# Patient Record
Sex: MALE | Race: WHITE | NOT HISPANIC OR LATINO | URBAN - METROPOLITAN AREA
[De-identification: names, ages, dates, MRNs, and addresses within clinical notes are randomized per-mention and may not be internally consistent; named-entity substitution may affect disease eponyms.]

---

## 2016-07-29 RX ORDER — ZOLPIDEM TARTRATE 10 MG/1
1 TABLET ORAL
Qty: 30 | Refills: 0 | DISCHARGE
Start: 2016-07-29

## 2016-07-29 RX ORDER — ZOLPIDEM TARTRATE 10 MG/1
0 TABLET ORAL
Qty: 30 | Refills: 0 | COMMUNITY
Start: 2016-07-29

## 2016-11-24 RX ORDER — SIMVASTATIN 20 MG/1
0 TABLET, FILM COATED ORAL
Qty: 45 | Refills: 0 | COMMUNITY
Start: 2016-11-24

## 2016-11-24 RX ORDER — SIMVASTATIN 20 MG/1
1 TABLET, FILM COATED ORAL
Qty: 45 | Refills: 0 | COMMUNITY
Start: 2016-11-24

## 2017-02-02 RX ORDER — CARBIDOPA AND LEVODOPA 25; 100 MG/1; MG/1
1 TABLET ORAL
Qty: 540 | Refills: 0 | DISCHARGE
Start: 2017-02-02

## 2017-02-02 RX ORDER — CARBIDOPA AND LEVODOPA 25; 100 MG/1; MG/1
0 TABLET ORAL
Qty: 540 | Refills: 0 | COMMUNITY
Start: 2017-02-02

## 2017-02-08 ENCOUNTER — EMERGENCY (EMERGENCY)
Facility: HOSPITAL | Age: 77
LOS: 1 days | Discharge: HOME | End: 2017-02-08
Attending: EMERGENCY MEDICINE | Admitting: EMERGENCY MEDICINE
Payer: MEDICARE

## 2017-02-08 VITALS
OXYGEN SATURATION: 95 % | WEIGHT: 184.97 LBS | RESPIRATION RATE: 16 BRPM | SYSTOLIC BLOOD PRESSURE: 120 MMHG | DIASTOLIC BLOOD PRESSURE: 78 MMHG | TEMPERATURE: 98 F | HEART RATE: 84 BPM

## 2017-02-08 VITALS
TEMPERATURE: 98 F | RESPIRATION RATE: 20 BRPM | OXYGEN SATURATION: 96 % | SYSTOLIC BLOOD PRESSURE: 141 MMHG | HEART RATE: 80 BPM | DIASTOLIC BLOOD PRESSURE: 86 MMHG

## 2017-02-08 DIAGNOSIS — Z95.5 PRESENCE OF CORONARY ANGIOPLASTY IMPLANT AND GRAFT: ICD-10-CM

## 2017-02-08 DIAGNOSIS — I25.10 ATHEROSCLEROTIC HEART DISEASE OF NATIVE CORONARY ARTERY WITHOUT ANGINA PECTORIS: ICD-10-CM

## 2017-02-08 DIAGNOSIS — E78.5 HYPERLIPIDEMIA, UNSPECIFIED: ICD-10-CM

## 2017-02-08 DIAGNOSIS — Z87.891 PERSONAL HISTORY OF NICOTINE DEPENDENCE: ICD-10-CM

## 2017-02-08 DIAGNOSIS — R10.31 RIGHT LOWER QUADRANT PAIN: ICD-10-CM

## 2017-02-08 DIAGNOSIS — G20 PARKINSON'S DISEASE: ICD-10-CM

## 2017-02-08 DIAGNOSIS — Z79.02 LONG TERM (CURRENT) USE OF ANTITHROMBOTICS/ANTIPLATELETS: ICD-10-CM

## 2017-02-08 LAB
ALBUMIN SERPL ELPH-MCNC: 3.6 G/DL — SIGNIFICANT CHANGE UP (ref 3.3–5)
ALP SERPL-CCNC: 79 U/L — SIGNIFICANT CHANGE UP (ref 40–120)
ALT FLD-CCNC: 9 U/L — LOW (ref 12–78)
AMYLASE P1 CFR SERPL: 61 U/L — SIGNIFICANT CHANGE UP (ref 25–115)
ANION GAP SERPL CALC-SCNC: 8 MMOL/L — SIGNIFICANT CHANGE UP (ref 5–17)
APPEARANCE UR: CLEAR — SIGNIFICANT CHANGE UP
APTT BLD: 33.6 SEC — SIGNIFICANT CHANGE UP (ref 27.5–37.4)
AST SERPL-CCNC: 15 U/L — SIGNIFICANT CHANGE UP (ref 15–37)
BACTERIA # UR AUTO: ABNORMAL
BASOPHILS # BLD AUTO: 0.1 K/UL — SIGNIFICANT CHANGE UP (ref 0–0.2)
BASOPHILS NFR BLD AUTO: 1.3 % — SIGNIFICANT CHANGE UP (ref 0–2)
BILIRUB SERPL-MCNC: 0.5 MG/DL — SIGNIFICANT CHANGE UP (ref 0.2–1.2)
BILIRUB UR-MCNC: NEGATIVE — SIGNIFICANT CHANGE UP
BUN SERPL-MCNC: 19 MG/DL — SIGNIFICANT CHANGE UP (ref 7–23)
CALCIUM SERPL-MCNC: 8.8 MG/DL — SIGNIFICANT CHANGE UP (ref 8.5–10.1)
CHLORIDE SERPL-SCNC: 109 MMOL/L — HIGH (ref 96–108)
CO2 SERPL-SCNC: 29 MMOL/L — SIGNIFICANT CHANGE UP (ref 22–31)
COLOR SPEC: YELLOW — SIGNIFICANT CHANGE UP
CREAT SERPL-MCNC: 0.82 MG/DL — SIGNIFICANT CHANGE UP (ref 0.5–1.3)
DIFF PNL FLD: ABNORMAL
EOSINOPHIL # BLD AUTO: 0.1 K/UL — SIGNIFICANT CHANGE UP (ref 0–0.5)
EOSINOPHIL NFR BLD AUTO: 1.4 % — SIGNIFICANT CHANGE UP (ref 0–6)
EPI CELLS # UR: SIGNIFICANT CHANGE UP
GLUCOSE SERPL-MCNC: 111 MG/DL — HIGH (ref 70–99)
GLUCOSE UR QL: NEGATIVE — SIGNIFICANT CHANGE UP
HCT VFR BLD CALC: 41 % — SIGNIFICANT CHANGE UP (ref 39–50)
HGB BLD-MCNC: 13.4 G/DL — SIGNIFICANT CHANGE UP (ref 13–17)
INR BLD: 1.14 RATIO — SIGNIFICANT CHANGE UP (ref 0.88–1.16)
KETONES UR-MCNC: NEGATIVE — SIGNIFICANT CHANGE UP
LEUKOCYTE ESTERASE UR-ACNC: NEGATIVE — SIGNIFICANT CHANGE UP
LIDOCAIN IGE QN: 111 U/L — SIGNIFICANT CHANGE UP (ref 73–393)
LYMPHOCYTES # BLD AUTO: 1.3 K/UL — SIGNIFICANT CHANGE UP (ref 1–3.3)
LYMPHOCYTES # BLD AUTO: 21.4 % — SIGNIFICANT CHANGE UP (ref 13–44)
MCHC RBC-ENTMCNC: 30.5 PG — SIGNIFICANT CHANGE UP (ref 27–34)
MCHC RBC-ENTMCNC: 32.8 GM/DL — SIGNIFICANT CHANGE UP (ref 32–36)
MCV RBC AUTO: 93 FL — SIGNIFICANT CHANGE UP (ref 80–100)
MONOCYTES # BLD AUTO: 0.8 K/UL — SIGNIFICANT CHANGE UP (ref 0–0.9)
MONOCYTES NFR BLD AUTO: 12.3 % — HIGH (ref 1–9)
NEUTROPHILS # BLD AUTO: 4 K/UL — SIGNIFICANT CHANGE UP (ref 1.8–7.4)
NEUTROPHILS NFR BLD AUTO: 63.6 % — SIGNIFICANT CHANGE UP (ref 43–77)
NITRITE UR-MCNC: NEGATIVE — SIGNIFICANT CHANGE UP
PH UR: 7 — SIGNIFICANT CHANGE UP (ref 4.8–8)
PLATELET # BLD AUTO: 200 K/UL — SIGNIFICANT CHANGE UP (ref 150–400)
POTASSIUM SERPL-MCNC: 4 MMOL/L — SIGNIFICANT CHANGE UP (ref 3.5–5.3)
POTASSIUM SERPL-SCNC: 4 MMOL/L — SIGNIFICANT CHANGE UP (ref 3.5–5.3)
PROT SERPL-MCNC: 6.9 G/DL — SIGNIFICANT CHANGE UP (ref 6–8.3)
PROT UR-MCNC: NEGATIVE — SIGNIFICANT CHANGE UP
PROTHROM AB SERPL-ACNC: 12.7 SEC — SIGNIFICANT CHANGE UP (ref 10–13.1)
RBC # BLD: 4.41 M/UL — SIGNIFICANT CHANGE UP (ref 4.2–5.8)
RBC # FLD: 12.7 % — SIGNIFICANT CHANGE UP (ref 10.3–14.5)
RBC CASTS # UR COMP ASSIST: SIGNIFICANT CHANGE UP /HPF (ref 0–4)
SODIUM SERPL-SCNC: 146 MMOL/L — HIGH (ref 135–145)
SP GR SPEC: 1.02 — SIGNIFICANT CHANGE UP (ref 1.01–1.02)
UROBILINOGEN FLD QL: NEGATIVE — SIGNIFICANT CHANGE UP
WBC # BLD: 6.2 K/UL — SIGNIFICANT CHANGE UP (ref 3.8–10.5)
WBC # FLD AUTO: 6.2 K/UL — SIGNIFICANT CHANGE UP (ref 3.8–10.5)
WBC UR QL: SIGNIFICANT CHANGE UP

## 2017-02-08 PROCEDURE — 80053 COMPREHEN METABOLIC PANEL: CPT

## 2017-02-08 PROCEDURE — 83690 ASSAY OF LIPASE: CPT

## 2017-02-08 PROCEDURE — 82150 ASSAY OF AMYLASE: CPT

## 2017-02-08 PROCEDURE — 81001 URINALYSIS AUTO W/SCOPE: CPT

## 2017-02-08 PROCEDURE — 74020: CPT

## 2017-02-08 PROCEDURE — 85610 PROTHROMBIN TIME: CPT

## 2017-02-08 PROCEDURE — 74177 CT ABD & PELVIS W/CONTRAST: CPT

## 2017-02-08 PROCEDURE — 71020: CPT | Mod: 26

## 2017-02-08 PROCEDURE — 74020: CPT | Mod: 26

## 2017-02-08 PROCEDURE — 99284 EMERGENCY DEPT VISIT MOD MDM: CPT | Mod: 25

## 2017-02-08 PROCEDURE — 85730 THROMBOPLASTIN TIME PARTIAL: CPT

## 2017-02-08 PROCEDURE — 74177 CT ABD & PELVIS W/CONTRAST: CPT | Mod: 26

## 2017-02-08 PROCEDURE — 99285 EMERGENCY DEPT VISIT HI MDM: CPT

## 2017-02-08 PROCEDURE — 87086 URINE CULTURE/COLONY COUNT: CPT

## 2017-02-08 PROCEDURE — 85027 COMPLETE CBC AUTOMATED: CPT

## 2017-02-08 PROCEDURE — 71046 X-RAY EXAM CHEST 2 VIEWS: CPT

## 2017-02-08 RX ORDER — IOHEXOL 300 MG/ML
30 INJECTION, SOLUTION INTRAVENOUS ONCE
Qty: 0 | Refills: 0 | Status: COMPLETED | OUTPATIENT
Start: 2017-02-08 | End: 2017-02-08

## 2017-02-08 RX ORDER — SODIUM CHLORIDE 9 MG/ML
3 INJECTION INTRAMUSCULAR; INTRAVENOUS; SUBCUTANEOUS ONCE
Qty: 0 | Refills: 0 | Status: COMPLETED | OUTPATIENT
Start: 2017-02-08 | End: 2017-02-08

## 2017-02-08 RX ORDER — SODIUM CHLORIDE 9 MG/ML
1000 INJECTION INTRAMUSCULAR; INTRAVENOUS; SUBCUTANEOUS
Qty: 0 | Refills: 0 | Status: COMPLETED | OUTPATIENT
Start: 2017-02-08 | End: 2017-02-08

## 2017-02-08 RX ADMIN — SODIUM CHLORIDE 1000 MILLILITER(S): 9 INJECTION INTRAMUSCULAR; INTRAVENOUS; SUBCUTANEOUS at 16:45

## 2017-02-08 RX ADMIN — SODIUM CHLORIDE 3 MILLILITER(S): 9 INJECTION INTRAMUSCULAR; INTRAVENOUS; SUBCUTANEOUS at 18:59

## 2017-02-08 RX ADMIN — IOHEXOL 30 MILLILITER(S): 300 INJECTION, SOLUTION INTRAVENOUS at 16:45

## 2017-02-08 RX ADMIN — SODIUM CHLORIDE 1000 MILLILITER(S): 9 INJECTION INTRAMUSCULAR; INTRAVENOUS; SUBCUTANEOUS at 18:59

## 2017-02-08 RX ADMIN — SODIUM CHLORIDE 1000 MILLILITER(S): 9 INJECTION INTRAMUSCULAR; INTRAVENOUS; SUBCUTANEOUS at 18:30

## 2017-02-08 NOTE — ED PROVIDER NOTE - PROGRESS NOTE DETAILS
Reevaluated patient at bedside.  Patient feeling much improved.  Abdomen is now soft, non-tender. Discussed the results of all diagnostic testing in ED.  An opportunity to ask questions was given.  Discussed the nature of diagnostic testing in the abdomen and the importance of continued reevaluation.  Understanding of the potential risk of occult pathology was verbalized and the patient will continue to follow-up as an outpatient for further workup and evaluation until resolution.  Discussed the importance of prompt, close medical follow-up.  Patient will return with any changes, concerns or persistent / worsening symptoms.   All results were explained to patient and family, including all CT findings,  and a copy of all available results given.

## 2017-02-08 NOTE — ED PROVIDER NOTE - OBJECTIVE STATEMENT
76 yo M p/w RLQ pain today, onset this afternoon. Pt states felt same pain 1 week ago, then resolved until today. No fever/chills. No n/v/d. NO weak / dizzy / malaise. no recent illness. No recent trauma. No recent sick contacts. NO agg/allev factors. No other inj or co.

## 2017-02-08 NOTE — ED PROVIDER NOTE - CHPI ED SYMPTOMS NEG
no blood in stool/no burning urination/no hematuria/no dysuria/no diarrhea/no abdominal distension/no chills/no fever

## 2017-02-08 NOTE — ED ADULT NURSE NOTE - PMH
CAD (Coronary Artery Disease)  2001, s/p cardiac cath with stent 2001  Hyperlipidemia    Hyperlipidemia  2006  Parkinson Disease  2009  Renal Calculi  1984

## 2017-02-08 NOTE — ED ADULT NURSE NOTE - CHPI ED SYMPTOMS NEG
no abdominal distension/no blood in stool/no dysuria/no hematuria/no diarrhea/no nausea/no chills/no vomiting/no burning urination/no fever

## 2017-02-08 NOTE — ED PROVIDER NOTE - GASTROINTESTINAL, MLM
Abdomen soft, mild tender R upper pelvis, ? reducible hernia, no rebound,  no guarding. No mid / upper abd tend. No HSM.

## 2017-02-08 NOTE — ED ADULT NURSE REASSESSMENT NOTE - NS ED NURSE REASSESS COMMENT FT1
discharge instructions explained and a hard copy provided. patient is been discharged in stable conditions
pt taken for CT

## 2017-02-09 LAB
CULTURE RESULTS: NO GROWTH — SIGNIFICANT CHANGE UP
SPECIMEN SOURCE: SIGNIFICANT CHANGE UP

## 2017-02-22 RX ORDER — FINASTERIDE 5 MG/1
1 TABLET, FILM COATED ORAL
Qty: 90 | Refills: 0 | COMMUNITY
Start: 2017-02-22

## 2017-02-22 RX ORDER — FINASTERIDE 5 MG/1
0 TABLET, FILM COATED ORAL
Qty: 90 | Refills: 0 | COMMUNITY
Start: 2017-02-22

## 2017-03-03 ENCOUNTER — OUTPATIENT (OUTPATIENT)
Dept: OUTPATIENT SERVICES | Facility: HOSPITAL | Age: 77
LOS: 1 days | End: 2017-03-03
Payer: MEDICARE

## 2017-03-03 VITALS
DIASTOLIC BLOOD PRESSURE: 80 MMHG | WEIGHT: 180.78 LBS | HEART RATE: 79 BPM | SYSTOLIC BLOOD PRESSURE: 110 MMHG | TEMPERATURE: 98 F | OXYGEN SATURATION: 98 % | HEIGHT: 69 IN | RESPIRATION RATE: 12 BRPM

## 2017-03-03 DIAGNOSIS — K40.90 UNILATERAL INGUINAL HERNIA, WITHOUT OBSTRUCTION OR GANGRENE, NOT SPECIFIED AS RECURRENT: ICD-10-CM

## 2017-03-03 DIAGNOSIS — Z01.818 ENCOUNTER FOR OTHER PREPROCEDURAL EXAMINATION: ICD-10-CM

## 2017-03-03 LAB
ALBUMIN SERPL ELPH-MCNC: 3.9 G/DL — SIGNIFICANT CHANGE UP (ref 3.3–5)
ALP SERPL-CCNC: 79 U/L — SIGNIFICANT CHANGE UP (ref 30–120)
ALT FLD-CCNC: <10 U/L DA — LOW (ref 10–60)
ANION GAP SERPL CALC-SCNC: 5 MMOL/L — SIGNIFICANT CHANGE UP (ref 5–17)
AST SERPL-CCNC: 15 U/L — SIGNIFICANT CHANGE UP (ref 10–40)
BILIRUB SERPL-MCNC: 0.5 MG/DL — SIGNIFICANT CHANGE UP (ref 0.2–1.2)
BUN SERPL-MCNC: 24 MG/DL — HIGH (ref 7–23)
CALCIUM SERPL-MCNC: 9.3 MG/DL — SIGNIFICANT CHANGE UP (ref 8.4–10.5)
CHLORIDE SERPL-SCNC: 108 MMOL/L — SIGNIFICANT CHANGE UP (ref 96–108)
CO2 SERPL-SCNC: 29 MMOL/L — SIGNIFICANT CHANGE UP (ref 22–31)
CREAT SERPL-MCNC: 0.86 MG/DL — SIGNIFICANT CHANGE UP (ref 0.5–1.3)
GLUCOSE SERPL-MCNC: 112 MG/DL — HIGH (ref 70–99)
HCT VFR BLD CALC: 44.3 % — SIGNIFICANT CHANGE UP (ref 39–50)
HGB BLD-MCNC: 15 G/DL — SIGNIFICANT CHANGE UP (ref 13–17)
MCHC RBC-ENTMCNC: 32.1 PG — SIGNIFICANT CHANGE UP (ref 27–34)
MCHC RBC-ENTMCNC: 33.8 GM/DL — SIGNIFICANT CHANGE UP (ref 32–36)
MCV RBC AUTO: 94.9 FL — SIGNIFICANT CHANGE UP (ref 80–100)
PLATELET # BLD AUTO: 218 K/UL — SIGNIFICANT CHANGE UP (ref 150–400)
POTASSIUM SERPL-MCNC: 4.1 MMOL/L — SIGNIFICANT CHANGE UP (ref 3.5–5.3)
POTASSIUM SERPL-SCNC: 4.1 MMOL/L — SIGNIFICANT CHANGE UP (ref 3.5–5.3)
PROT SERPL-MCNC: 7.3 G/DL — SIGNIFICANT CHANGE UP (ref 6–8.3)
RBC # BLD: 4.67 M/UL — SIGNIFICANT CHANGE UP (ref 4.2–5.8)
RBC # FLD: 12.4 % — SIGNIFICANT CHANGE UP (ref 10.3–14.5)
SODIUM SERPL-SCNC: 142 MMOL/L — SIGNIFICANT CHANGE UP (ref 135–145)
WBC # BLD: 6.3 K/UL — SIGNIFICANT CHANGE UP (ref 3.8–10.5)
WBC # FLD AUTO: 6.3 K/UL — SIGNIFICANT CHANGE UP (ref 3.8–10.5)

## 2017-03-03 PROCEDURE — 36415 COLL VENOUS BLD VENIPUNCTURE: CPT

## 2017-03-03 PROCEDURE — 85027 COMPLETE CBC AUTOMATED: CPT

## 2017-03-03 PROCEDURE — 80053 COMPREHEN METABOLIC PANEL: CPT

## 2017-03-03 PROCEDURE — G0463: CPT

## 2017-03-03 RX ORDER — TRIHEXYPHENIDYL HCL 2 MG
0 TABLET ORAL
Qty: 0 | Refills: 0 | COMMUNITY

## 2017-03-03 NOTE — H&P PST ADULT - NSANTHOSAYNRD_GEN_A_CORE
No. TOBY screening performed.  STOP BANG Legend: 0-2 = LOW Risk; 3-4 = INTERMEDIATE Risk; 5-8 = HIGH Risk

## 2017-03-03 NOTE — H&P PST ADULT - PROBLEM SELECTOR PLAN 1
Right inguinal hernia repair NPO after Midnight.  Medical & Cardiac Clearance.  Surgical scrub reviewed. D/C instructions reviewed. Handouts given. Reviewed pepcid use.  Medication reviewed.  Advised patient to stop 7 days prior to surgery Stop Follow up with PMD on Plavix/ASA hold date prior to surgery. NSAIDS, ASA herbals, vit E .  Patient advised of no jewelry day of surgery.  Patient given pre operative wash with instructions.  Pre op info sheet given. Day of Surgery you can take the following meds with a small sip of water.  Finasteride, Sinemet, Trihexyphenidyl.

## 2017-03-03 NOTE — H&P PST ADULT - PMH
CAD (Coronary Artery Disease)  2001, s/p cardiac cath with stent 2001  Constipation    Hyperlipidemia  2006  Inguinal hernia  right  Parkinson Disease  2009  Renal Calculi  1984

## 2017-03-03 NOTE — H&P PST ADULT - HISTORY OF PRESENT ILLNESS
This is a 76 yo male who presents to [facility] with hx of right inguinal pain 8/10 x 1 month seen in ED in [place] and referred to    Dr Nance  secondary to decrease ambulation, mobility and ADL function pt. referred for surgery

## 2017-03-06 RX ORDER — SODIUM CHLORIDE 9 MG/ML
1000 INJECTION, SOLUTION INTRAVENOUS
Qty: 0 | Refills: 0 | Status: DISCONTINUED | OUTPATIENT
Start: 2017-03-09 | End: 2017-03-24

## 2017-03-06 NOTE — ASU DISCHARGE PLAN (ADULT/PEDIATRIC). - SPECIAL INSTRUCTIONS
remove pain catheter in 48 hours and apply band aid to site   apply pressure to pain catheter site for a full 10 minutes by the clock when you take out the pain catheter

## 2017-03-06 NOTE — ASU DISCHARGE PLAN (ADULT/PEDIATRIC). - MEDICATION SUMMARY - MEDICATIONS TO TAKE
I will START or STAY ON the medications listed below when I get home from the hospital:    chlorhexidine gluconateal rinse UPS 0.12%  -- 1 cap(s) by mouth 3 times a week  -- Indication: For as per md    FINASTERIDE 5 MG TABS  -- 1 tab(s) by mouth once a day  -- Indication: For as pe md    acetaminophen-oxyCODONE 325 mg-5 mg oral tablet  -- 1 tab(s) by mouth every 6 hours, As Needed -for moderate pain MDD:4  -- Caution federal law prohibits the transfer of this drug to any person other  than the person for whom it was prescribed.  May cause drowsiness.  Alcohol may intensify this effect.  Use care when operating dangerous machinery.  This prescription cannot be refilled.  This product contains acetaminophen.  Do not use  with any other product containing acetaminophen to prevent possible liver damage.  Using more of this medication than prescribed may cause serious breathing problems.    -- Indication: For pain    Aspir 81 oral delayed release tablet  -- 1 tab(s) by mouth once a day  -- Indication: For as per md    SIMVASTATIN 40 MG TABS  -- 1 tab(s) by mouth once a day (at bedtime)  -- Indication: For as per md    trihexyphenidyl  -- 2 milligram(s) by mouth 3 times a day  -- Indication: For as per md    CARB/LEVO    TAB 25-100MG  -- 1 tab(s) by mouth 3 times a day  -- Indication: For as per md    Plavix 75 mg oral tablet  -- 1 tab(s) by mouth once a day  -- Indication: For as per md    ZOLPIDEM TARTRATE 10 MG TABS  -- 1 tab(s) by mouth once a day (at bedtime), As Needed  -- Indication: For as per md    magnesium citrate  -- 400 milligram(s) by mouth 2 times a day  -- Indication: For as per md    docusate sodium 100 mg oral capsule  -- 1 cap(s) by mouth 2 times a day  -- Indication: For as per md    Citracal  -- 1 tab(s) by mouth once a day  -- Indication: For as per md    Co Q-10 100 mg oral capsule  -- 2 cap(s) by mouth 2 times a day  -- Indication: For as per md    Systane Ultra Preservative Free ophthalmic solution  -- 1 drop(s) to each affected eye once a day  -- Indication: For as per md    Centrum Silver oral tablet  -- 1 tab(s) by mouth once a day  -- Indication: For as per md    Vitamin D3 1000 intl units oral capsule  -- 1 cap(s) by mouth once a day  -- Indication: For as per md

## 2017-03-08 ENCOUNTER — RESULT REVIEW (OUTPATIENT)
Age: 77
End: 2017-03-08

## 2017-03-09 ENCOUNTER — OUTPATIENT (OUTPATIENT)
Dept: OUTPATIENT SERVICES | Facility: HOSPITAL | Age: 77
LOS: 1 days | End: 2017-03-09
Payer: MEDICARE

## 2017-03-09 ENCOUNTER — TRANSCRIPTION ENCOUNTER (OUTPATIENT)
Age: 77
End: 2017-03-09

## 2017-03-09 VITALS
RESPIRATION RATE: 18 BRPM | HEART RATE: 73 BPM | HEIGHT: 70 IN | OXYGEN SATURATION: 96 % | DIASTOLIC BLOOD PRESSURE: 77 MMHG | SYSTOLIC BLOOD PRESSURE: 123 MMHG | WEIGHT: 178.79 LBS | TEMPERATURE: 97 F

## 2017-03-09 VITALS
DIASTOLIC BLOOD PRESSURE: 78 MMHG | SYSTOLIC BLOOD PRESSURE: 128 MMHG | HEART RATE: 73 BPM | RESPIRATION RATE: 18 BRPM | OXYGEN SATURATION: 100 %

## 2017-03-09 DIAGNOSIS — Z01.818 ENCOUNTER FOR OTHER PREPROCEDURAL EXAMINATION: ICD-10-CM

## 2017-03-09 DIAGNOSIS — K40.90 UNILATERAL INGUINAL HERNIA, WITHOUT OBSTRUCTION OR GANGRENE, NOT SPECIFIED AS RECURRENT: ICD-10-CM

## 2017-03-09 PROCEDURE — C1781: CPT

## 2017-03-09 PROCEDURE — 11981 INSERTION DRUG DLVR IMPLANT: CPT | Mod: AS

## 2017-03-09 PROCEDURE — 49505 PRP I/HERN INIT REDUC >5 YR: CPT | Mod: RT

## 2017-03-09 PROCEDURE — 49505 PRP I/HERN INIT REDUC >5 YR: CPT | Mod: AS

## 2017-03-09 PROCEDURE — 88305 TISSUE EXAM BY PATHOLOGIST: CPT

## 2017-03-09 PROCEDURE — 88302 TISSUE EXAM BY PATHOLOGIST: CPT

## 2017-03-09 PROCEDURE — C1889: CPT

## 2017-03-09 PROCEDURE — 88302 TISSUE EXAM BY PATHOLOGIST: CPT | Mod: 26

## 2017-03-09 PROCEDURE — 88305 TISSUE EXAM BY PATHOLOGIST: CPT | Mod: 26

## 2017-03-09 RX ORDER — CEFAZOLIN SODIUM 1 G
2000 VIAL (EA) INJECTION ONCE
Qty: 0 | Refills: 0 | Status: COMPLETED | OUTPATIENT
Start: 2017-03-09 | End: 2017-03-09

## 2017-03-09 RX ORDER — ONDANSETRON 8 MG/1
4 TABLET, FILM COATED ORAL ONCE
Qty: 0 | Refills: 0 | Status: DISCONTINUED | OUTPATIENT
Start: 2017-03-09 | End: 2017-03-24

## 2017-03-09 RX ORDER — HYDROMORPHONE HYDROCHLORIDE 2 MG/ML
0.5 INJECTION INTRAMUSCULAR; INTRAVENOUS; SUBCUTANEOUS
Qty: 0 | Refills: 0 | Status: DISCONTINUED | OUTPATIENT
Start: 2017-03-09 | End: 2017-03-09

## 2017-03-09 RX ORDER — OXYCODONE HYDROCHLORIDE 5 MG/1
1 TABLET ORAL
Qty: 28 | Refills: 0 | OUTPATIENT
Start: 2017-03-09 | End: 2017-03-16

## 2017-03-09 RX ORDER — TAMSULOSIN HYDROCHLORIDE 0.4 MG/1
0.4 CAPSULE ORAL ONCE
Qty: 0 | Refills: 0 | Status: COMPLETED | OUTPATIENT
Start: 2017-03-09 | End: 2017-03-09

## 2017-03-09 RX ORDER — BUPIVACAINE HCL/PF 7.5 MG/ML
100 VIAL (ML) INJECTION
Qty: 0 | Refills: 0 | Status: DISCONTINUED | OUTPATIENT
Start: 2017-03-09 | End: 2017-03-24

## 2017-03-09 RX ORDER — OXYCODONE HYDROCHLORIDE 5 MG/1
5 TABLET ORAL ONCE
Qty: 0 | Refills: 0 | Status: DISCONTINUED | OUTPATIENT
Start: 2017-03-09 | End: 2017-03-09

## 2017-03-09 RX ORDER — SODIUM CHLORIDE 9 MG/ML
1000 INJECTION, SOLUTION INTRAVENOUS
Qty: 0 | Refills: 0 | Status: DISCONTINUED | OUTPATIENT
Start: 2017-03-09 | End: 2017-03-24

## 2017-03-09 RX ADMIN — SODIUM CHLORIDE 100 MILLILITER(S): 9 INJECTION, SOLUTION INTRAVENOUS at 10:11

## 2017-03-09 RX ADMIN — SODIUM CHLORIDE 75 MILLILITER(S): 9 INJECTION, SOLUTION INTRAVENOUS at 07:53

## 2017-03-09 RX ADMIN — TAMSULOSIN HYDROCHLORIDE 0.4 MILLIGRAM(S): 0.4 CAPSULE ORAL at 10:55

## 2017-08-18 NOTE — ASU DISCHARGE PLAN (ADULT/PEDIATRIC). - NOTIFY
Urology service Dr. Yanez Pain not relieved by Medications/Bleeding that does not stop/Fever greater than 101/Persistent Nausea and Vomiting/Unable to Urinate Urology service Dr. Yanez 

## 2018-07-24 ENCOUNTER — EMERGENCY (EMERGENCY)
Facility: HOSPITAL | Age: 78
LOS: 1 days | Discharge: HOME | End: 2018-07-24
Attending: EMERGENCY MEDICINE
Payer: MEDICARE

## 2018-07-24 VITALS
SYSTOLIC BLOOD PRESSURE: 150 MMHG | HEIGHT: 72 IN | RESPIRATION RATE: 14 BRPM | HEART RATE: 79 BPM | WEIGHT: 175.05 LBS | DIASTOLIC BLOOD PRESSURE: 90 MMHG | TEMPERATURE: 98 F | OXYGEN SATURATION: 99 %

## 2018-07-24 VITALS
DIASTOLIC BLOOD PRESSURE: 72 MMHG | HEART RATE: 78 BPM | TEMPERATURE: 98 F | SYSTOLIC BLOOD PRESSURE: 140 MMHG | RESPIRATION RATE: 14 BRPM | OXYGEN SATURATION: 99 %

## 2018-07-24 PROBLEM — K59.00 CONSTIPATION, UNSPECIFIED: Chronic | Status: ACTIVE | Noted: 2017-03-03

## 2018-07-24 PROBLEM — K40.90 UNILATERAL INGUINAL HERNIA, WITHOUT OBSTRUCTION OR GANGRENE, NOT SPECIFIED AS RECURRENT: Chronic | Status: ACTIVE | Noted: 2017-03-03

## 2018-07-24 LAB
ALBUMIN SERPL ELPH-MCNC: 3.7 G/DL — SIGNIFICANT CHANGE UP (ref 3.3–5)
ALP SERPL-CCNC: 84 U/L — SIGNIFICANT CHANGE UP (ref 40–120)
ALT FLD-CCNC: 25 U/L — SIGNIFICANT CHANGE UP (ref 12–78)
ANION GAP SERPL CALC-SCNC: 7 MMOL/L — SIGNIFICANT CHANGE UP (ref 5–17)
APTT BLD: 29.7 SEC — SIGNIFICANT CHANGE UP (ref 27.5–37.4)
AST SERPL-CCNC: 16 U/L — SIGNIFICANT CHANGE UP (ref 15–37)
BASOPHILS # BLD AUTO: 0.03 K/UL — SIGNIFICANT CHANGE UP (ref 0–0.2)
BASOPHILS NFR BLD AUTO: 0.4 % — SIGNIFICANT CHANGE UP (ref 0–2)
BILIRUB SERPL-MCNC: 0.5 MG/DL — SIGNIFICANT CHANGE UP (ref 0.2–1.2)
BUN SERPL-MCNC: 19 MG/DL — SIGNIFICANT CHANGE UP (ref 7–23)
CALCIUM SERPL-MCNC: 9.3 MG/DL — SIGNIFICANT CHANGE UP (ref 8.5–10.1)
CHLORIDE SERPL-SCNC: 108 MMOL/L — SIGNIFICANT CHANGE UP (ref 96–108)
CK MB BLD-MCNC: <2 % — SIGNIFICANT CHANGE UP (ref 0–3.5)
CK MB CFR SERPL CALC: <1 NG/ML — SIGNIFICANT CHANGE UP (ref 0–3.6)
CK SERPL-CCNC: 50 U/L — SIGNIFICANT CHANGE UP (ref 26–308)
CO2 SERPL-SCNC: 28 MMOL/L — SIGNIFICANT CHANGE UP (ref 22–31)
CREAT SERPL-MCNC: 0.92 MG/DL — SIGNIFICANT CHANGE UP (ref 0.5–1.3)
EOSINOPHIL # BLD AUTO: 0.02 K/UL — SIGNIFICANT CHANGE UP (ref 0–0.5)
EOSINOPHIL NFR BLD AUTO: 0.3 % — SIGNIFICANT CHANGE UP (ref 0–6)
GLUCOSE SERPL-MCNC: 128 MG/DL — HIGH (ref 70–99)
HCT VFR BLD CALC: 40.4 % — SIGNIFICANT CHANGE UP (ref 39–50)
HGB BLD-MCNC: 13.5 G/DL — SIGNIFICANT CHANGE UP (ref 13–17)
IMM GRANULOCYTES NFR BLD AUTO: 0.3 % — SIGNIFICANT CHANGE UP (ref 0–1.5)
INR BLD: 1.1 RATIO — SIGNIFICANT CHANGE UP (ref 0.88–1.16)
LIDOCAIN IGE QN: 76 U/L — SIGNIFICANT CHANGE UP (ref 73–393)
LYMPHOCYTES # BLD AUTO: 1.07 K/UL — SIGNIFICANT CHANGE UP (ref 1–3.3)
LYMPHOCYTES # BLD AUTO: 15.1 % — SIGNIFICANT CHANGE UP (ref 13–44)
MAGNESIUM SERPL-MCNC: 2.2 MG/DL — SIGNIFICANT CHANGE UP (ref 1.6–2.6)
MCHC RBC-ENTMCNC: 31.2 PG — SIGNIFICANT CHANGE UP (ref 27–34)
MCHC RBC-ENTMCNC: 33.4 GM/DL — SIGNIFICANT CHANGE UP (ref 32–36)
MCV RBC AUTO: 93.3 FL — SIGNIFICANT CHANGE UP (ref 80–100)
MONOCYTES # BLD AUTO: 0.63 K/UL — SIGNIFICANT CHANGE UP (ref 0–0.9)
MONOCYTES NFR BLD AUTO: 8.9 % — SIGNIFICANT CHANGE UP (ref 2–14)
NEUTROPHILS # BLD AUTO: 5.3 K/UL — SIGNIFICANT CHANGE UP (ref 1.8–7.4)
NEUTROPHILS NFR BLD AUTO: 75 % — SIGNIFICANT CHANGE UP (ref 43–77)
PLATELET # BLD AUTO: 226 K/UL — SIGNIFICANT CHANGE UP (ref 150–400)
POTASSIUM SERPL-MCNC: 3.9 MMOL/L — SIGNIFICANT CHANGE UP (ref 3.5–5.3)
POTASSIUM SERPL-SCNC: 3.9 MMOL/L — SIGNIFICANT CHANGE UP (ref 3.5–5.3)
PROT SERPL-MCNC: 7 G/DL — SIGNIFICANT CHANGE UP (ref 6–8.3)
PROTHROM AB SERPL-ACNC: 12 SEC — SIGNIFICANT CHANGE UP (ref 9.8–12.7)
RBC # BLD: 4.33 M/UL — SIGNIFICANT CHANGE UP (ref 4.2–5.8)
RBC # FLD: 13.1 % — SIGNIFICANT CHANGE UP (ref 10.3–14.5)
SODIUM SERPL-SCNC: 143 MMOL/L — SIGNIFICANT CHANGE UP (ref 135–145)
TROPONIN I SERPL-MCNC: <.015 NG/ML — SIGNIFICANT CHANGE UP (ref 0.01–0.04)
WBC # BLD: 7.07 K/UL — SIGNIFICANT CHANGE UP (ref 3.8–10.5)
WBC # FLD AUTO: 7.07 K/UL — SIGNIFICANT CHANGE UP (ref 3.8–10.5)

## 2018-07-24 PROCEDURE — 93005 ELECTROCARDIOGRAM TRACING: CPT

## 2018-07-24 PROCEDURE — 84484 ASSAY OF TROPONIN QUANT: CPT

## 2018-07-24 PROCEDURE — 80053 COMPREHEN METABOLIC PANEL: CPT

## 2018-07-24 PROCEDURE — 85610 PROTHROMBIN TIME: CPT

## 2018-07-24 PROCEDURE — 96361 HYDRATE IV INFUSION ADD-ON: CPT

## 2018-07-24 PROCEDURE — 99284 EMERGENCY DEPT VISIT MOD MDM: CPT | Mod: 25

## 2018-07-24 PROCEDURE — 82553 CREATINE MB FRACTION: CPT

## 2018-07-24 PROCEDURE — 96375 TX/PRO/DX INJ NEW DRUG ADDON: CPT

## 2018-07-24 PROCEDURE — 96374 THER/PROPH/DIAG INJ IV PUSH: CPT

## 2018-07-24 PROCEDURE — 83735 ASSAY OF MAGNESIUM: CPT

## 2018-07-24 PROCEDURE — 82550 ASSAY OF CK (CPK): CPT

## 2018-07-24 PROCEDURE — 85027 COMPLETE CBC AUTOMATED: CPT

## 2018-07-24 PROCEDURE — 85730 THROMBOPLASTIN TIME PARTIAL: CPT

## 2018-07-24 PROCEDURE — 83690 ASSAY OF LIPASE: CPT

## 2018-07-24 PROCEDURE — 99284 EMERGENCY DEPT VISIT MOD MDM: CPT

## 2018-07-24 RX ORDER — METOCLOPRAMIDE HCL 10 MG
10 TABLET ORAL ONCE
Qty: 0 | Refills: 0 | Status: COMPLETED | OUTPATIENT
Start: 2018-07-24 | End: 2018-07-24

## 2018-07-24 RX ORDER — SODIUM CHLORIDE 9 MG/ML
1000 INJECTION INTRAMUSCULAR; INTRAVENOUS; SUBCUTANEOUS ONCE
Qty: 0 | Refills: 0 | Status: COMPLETED | OUTPATIENT
Start: 2018-07-24 | End: 2018-07-24

## 2018-07-24 RX ORDER — ONDANSETRON 8 MG/1
1 TABLET, FILM COATED ORAL
Qty: 9 | Refills: 0 | OUTPATIENT
Start: 2018-07-24 | End: 2018-07-26

## 2018-07-24 RX ORDER — ONDANSETRON 8 MG/1
4 TABLET, FILM COATED ORAL ONCE
Qty: 0 | Refills: 0 | Status: COMPLETED | OUTPATIENT
Start: 2018-07-24 | End: 2018-07-24

## 2018-07-24 RX ADMIN — SODIUM CHLORIDE 1000 MILLILITER(S): 9 INJECTION INTRAMUSCULAR; INTRAVENOUS; SUBCUTANEOUS at 02:00

## 2018-07-24 RX ADMIN — ONDANSETRON 4 MILLIGRAM(S): 8 TABLET, FILM COATED ORAL at 02:08

## 2018-07-24 RX ADMIN — Medication 10 MILLIGRAM(S): at 02:40

## 2018-07-24 NOTE — ED PROVIDER NOTE - OBJECTIVE STATEMENT
77 yo male hx of CAD with stent on plavix c/o nausea/dizziness since 1pm.  No syncope.  No chest pain, no shortness of breath. No headache.  No abdominal pain, no vomiting, no diarrhea. PMD. Dr. Hebert.  Cards from [email or web address].

## 2018-07-24 NOTE — ED ADULT NURSE NOTE - OBJECTIVE STATEMENT
patient a/o x 4 with a calm affect c/o nausea, dizziness and feels like if he vomits, he'd feel better.  patient denies abdominal pains at this time, pending results of initial lab work

## 2018-12-06 ENCOUNTER — EMERGENCY (EMERGENCY)
Facility: HOSPITAL | Age: 78
LOS: 1 days | Discharge: HOME | End: 2018-12-06
Attending: EMERGENCY MEDICINE | Admitting: EMERGENCY MEDICINE
Payer: MEDICARE

## 2018-12-06 VITALS
SYSTOLIC BLOOD PRESSURE: 147 MMHG | TEMPERATURE: 98 F | RESPIRATION RATE: 16 BRPM | HEART RATE: 65 BPM | OXYGEN SATURATION: 96 % | HEIGHT: 72 IN | WEIGHT: 169.98 LBS | DIASTOLIC BLOOD PRESSURE: 83 MMHG

## 2018-12-06 VITALS
RESPIRATION RATE: 16 BRPM | SYSTOLIC BLOOD PRESSURE: 124 MMHG | HEART RATE: 82 BPM | OXYGEN SATURATION: 97 % | TEMPERATURE: 98 F | DIASTOLIC BLOOD PRESSURE: 82 MMHG

## 2018-12-06 LAB
ALBUMIN SERPL ELPH-MCNC: 3.7 G/DL — SIGNIFICANT CHANGE UP (ref 3.3–5)
ALP SERPL-CCNC: 70 U/L — SIGNIFICANT CHANGE UP (ref 40–120)
ALT FLD-CCNC: 19 U/L — SIGNIFICANT CHANGE UP (ref 12–78)
ANION GAP SERPL CALC-SCNC: 6 MMOL/L — SIGNIFICANT CHANGE UP (ref 5–17)
AST SERPL-CCNC: 16 U/L — SIGNIFICANT CHANGE UP (ref 15–37)
BASOPHILS # BLD AUTO: 0.03 K/UL — SIGNIFICANT CHANGE UP (ref 0–0.2)
BASOPHILS NFR BLD AUTO: 0.5 % — SIGNIFICANT CHANGE UP (ref 0–2)
BILIRUB SERPL-MCNC: 0.6 MG/DL — SIGNIFICANT CHANGE UP (ref 0.2–1.2)
BUN SERPL-MCNC: 21 MG/DL — SIGNIFICANT CHANGE UP (ref 7–23)
CALCIUM SERPL-MCNC: 8.4 MG/DL — LOW (ref 8.5–10.1)
CHLORIDE SERPL-SCNC: 110 MMOL/L — HIGH (ref 96–108)
CO2 SERPL-SCNC: 27 MMOL/L — SIGNIFICANT CHANGE UP (ref 22–31)
CREAT SERPL-MCNC: 0.76 MG/DL — SIGNIFICANT CHANGE UP (ref 0.5–1.3)
EOSINOPHIL # BLD AUTO: 0.08 K/UL — SIGNIFICANT CHANGE UP (ref 0–0.5)
EOSINOPHIL NFR BLD AUTO: 1.4 % — SIGNIFICANT CHANGE UP (ref 0–6)
GLUCOSE SERPL-MCNC: 92 MG/DL — SIGNIFICANT CHANGE UP (ref 70–99)
HCT VFR BLD CALC: 41.4 % — SIGNIFICANT CHANGE UP (ref 39–50)
HGB BLD-MCNC: 13.5 G/DL — SIGNIFICANT CHANGE UP (ref 13–17)
IMM GRANULOCYTES NFR BLD AUTO: 0 % — SIGNIFICANT CHANGE UP (ref 0–1.5)
LYMPHOCYTES # BLD AUTO: 1.07 K/UL — SIGNIFICANT CHANGE UP (ref 1–3.3)
LYMPHOCYTES # BLD AUTO: 18.7 % — SIGNIFICANT CHANGE UP (ref 13–44)
MCHC RBC-ENTMCNC: 30.7 PG — SIGNIFICANT CHANGE UP (ref 27–34)
MCHC RBC-ENTMCNC: 32.6 GM/DL — SIGNIFICANT CHANGE UP (ref 32–36)
MCV RBC AUTO: 94.1 FL — SIGNIFICANT CHANGE UP (ref 80–100)
MONOCYTES # BLD AUTO: 0.56 K/UL — SIGNIFICANT CHANGE UP (ref 0–0.9)
MONOCYTES NFR BLD AUTO: 9.8 % — SIGNIFICANT CHANGE UP (ref 2–14)
NEUTROPHILS # BLD AUTO: 3.98 K/UL — SIGNIFICANT CHANGE UP (ref 1.8–7.4)
NEUTROPHILS NFR BLD AUTO: 69.6 % — SIGNIFICANT CHANGE UP (ref 43–77)
NRBC # BLD: 0 /100 WBCS — SIGNIFICANT CHANGE UP (ref 0–0)
PLATELET # BLD AUTO: 204 K/UL — SIGNIFICANT CHANGE UP (ref 150–400)
POTASSIUM SERPL-MCNC: 4.2 MMOL/L — SIGNIFICANT CHANGE UP (ref 3.5–5.3)
POTASSIUM SERPL-SCNC: 4.2 MMOL/L — SIGNIFICANT CHANGE UP (ref 3.5–5.3)
PROT SERPL-MCNC: 6.7 G/DL — SIGNIFICANT CHANGE UP (ref 6–8.3)
RBC # BLD: 4.4 M/UL — SIGNIFICANT CHANGE UP (ref 4.2–5.8)
RBC # FLD: 12.8 % — SIGNIFICANT CHANGE UP (ref 10.3–14.5)
SODIUM SERPL-SCNC: 143 MMOL/L — SIGNIFICANT CHANGE UP (ref 135–145)
WBC # BLD: 5.72 K/UL — SIGNIFICANT CHANGE UP (ref 3.8–10.5)
WBC # FLD AUTO: 5.72 K/UL — SIGNIFICANT CHANGE UP (ref 3.8–10.5)

## 2018-12-06 PROCEDURE — 80053 COMPREHEN METABOLIC PANEL: CPT

## 2018-12-06 PROCEDURE — 70450 CT HEAD/BRAIN W/O DYE: CPT | Mod: 26

## 2018-12-06 PROCEDURE — 99284 EMERGENCY DEPT VISIT MOD MDM: CPT

## 2018-12-06 PROCEDURE — 85027 COMPLETE CBC AUTOMATED: CPT

## 2018-12-06 PROCEDURE — 36415 COLL VENOUS BLD VENIPUNCTURE: CPT

## 2018-12-06 PROCEDURE — 70450 CT HEAD/BRAIN W/O DYE: CPT

## 2018-12-06 PROCEDURE — 99284 EMERGENCY DEPT VISIT MOD MDM: CPT | Mod: 25

## 2018-12-06 PROCEDURE — 93005 ELECTROCARDIOGRAM TRACING: CPT

## 2018-12-06 RX ORDER — SODIUM CHLORIDE 9 MG/ML
3 INJECTION INTRAMUSCULAR; INTRAVENOUS; SUBCUTANEOUS ONCE
Qty: 0 | Refills: 0 | Status: COMPLETED | OUTPATIENT
Start: 2018-12-06 | End: 2018-12-06

## 2018-12-06 RX ORDER — SODIUM CHLORIDE 9 MG/ML
1000 INJECTION INTRAMUSCULAR; INTRAVENOUS; SUBCUTANEOUS
Qty: 0 | Refills: 0 | Status: DISCONTINUED | OUTPATIENT
Start: 2018-12-06 | End: 2018-12-10

## 2018-12-06 RX ADMIN — SODIUM CHLORIDE 3 MILLILITER(S): 9 INJECTION INTRAMUSCULAR; INTRAVENOUS; SUBCUTANEOUS at 10:51

## 2018-12-06 NOTE — ED PROVIDER NOTE - PMH
CAD (Coronary Artery Disease)  2001, s/p cardiac cath with stent 2001  Constipation    Hyperlipidemia  2006  Inguinal hernia  right  Parkinson Disease  2009  Renal Calculi  1984 CAD (coronary artery disease)    CAD (Coronary Artery Disease)  2001, s/p cardiac cath with stent 2001  Constipation    Hyperlipidemia  2006  Inguinal hernia  right  Parkinson Disease  2009  Renal Calculi  1984

## 2018-12-06 NOTE — ED PROVIDER NOTE - CHPI ED SYMPTOMS NEG
no shortness of breath/no vomiting/no nausea/no syncope/no fever/no chills/no diaphoresis/no back pain/no chest pain/no cough

## 2018-12-06 NOTE — ED PROVIDER NOTE - INTERPRETATION
av 1 degree block/normal sinus rhythm, Normal axis, Normal AR interval and QRS complex. There are no acute ischemic ST or T-wave changes.

## 2018-12-06 NOTE — ED ADULT NURSE NOTE - CHIEF COMPLAINT QUOTE
Patient called EMS because he said "I am going to explode". Patient complains of palpitations and dizziness. IV 20g LAC placed with KVO of NS.

## 2018-12-06 NOTE — ED PROVIDER NOTE - PROGRESS NOTE DETAILS
All results were explained to patient and/or family and a copy of all available results given.  pt ambulated c a cane s difficulty

## 2018-12-06 NOTE — ED PROVIDER NOTE - OBJECTIVE STATEMENT
pt got up around 0800 c chest palpitation and dizziness, lasted about 1 hour.  No chest pain, sob or fever.  no prior episode.  pmd-Rafi Malone in [place]. cardio- [place]

## 2019-06-28 PROBLEM — I25.10 ATHEROSCLEROTIC HEART DISEASE OF NATIVE CORONARY ARTERY WITHOUT ANGINA PECTORIS: Chronic | Status: ACTIVE | Noted: 2018-12-06

## 2019-10-02 ENCOUNTER — APPOINTMENT (OUTPATIENT)
Dept: INTERNAL MEDICINE | Facility: CLINIC | Age: 79
End: 2019-10-02
Payer: MEDICARE

## 2019-10-02 ENCOUNTER — NON-APPOINTMENT (OUTPATIENT)
Age: 79
End: 2019-10-02

## 2019-10-02 VITALS
HEART RATE: 66 BPM | WEIGHT: 167 LBS | OXYGEN SATURATION: 97 % | TEMPERATURE: 97.9 F | SYSTOLIC BLOOD PRESSURE: 100 MMHG | DIASTOLIC BLOOD PRESSURE: 70 MMHG

## 2019-10-02 DIAGNOSIS — Z98.890 OTHER SPECIFIED POSTPROCEDURAL STATES: ICD-10-CM

## 2019-10-02 DIAGNOSIS — Z87.891 PERSONAL HISTORY OF NICOTINE DEPENDENCE: ICD-10-CM

## 2019-10-02 DIAGNOSIS — Z98.42 CATARACT EXTRACTION STATUS, RIGHT EYE: ICD-10-CM

## 2019-10-02 DIAGNOSIS — H90.5 UNSPECIFIED SENSORINEURAL HEARING LOSS: ICD-10-CM

## 2019-10-02 DIAGNOSIS — Z87.19 OTHER SPECIFIED POSTPROCEDURAL STATES: ICD-10-CM

## 2019-10-02 DIAGNOSIS — Z98.41 CATARACT EXTRACTION STATUS, RIGHT EYE: ICD-10-CM

## 2019-10-02 DIAGNOSIS — Z00.00 ENCOUNTER FOR GENERAL ADULT MEDICAL EXAMINATION W/OUT ABNORMAL FINDINGS: ICD-10-CM

## 2019-10-02 PROCEDURE — 93000 ELECTROCARDIOGRAM COMPLETE: CPT

## 2019-10-02 PROCEDURE — 36415 COLL VENOUS BLD VENIPUNCTURE: CPT

## 2019-10-02 PROCEDURE — G0439: CPT

## 2019-10-02 PROCEDURE — G0444 DEPRESSION SCREEN ANNUAL: CPT | Mod: 59

## 2019-10-02 PROCEDURE — 82270 OCCULT BLOOD FECES: CPT

## 2019-10-02 RX ORDER — CLOPIDOGREL 75 MG/1
75 TABLET, FILM COATED ORAL DAILY
Qty: 90 | Refills: 3 | Status: ACTIVE | COMMUNITY
Start: 2019-10-02

## 2019-10-02 RX ORDER — CARBIDOPA AND LEVODOPA 25; 100 MG/1; MG/1
25-100 TABLET ORAL 3 TIMES DAILY
Refills: 0 | Status: ACTIVE | COMMUNITY
Start: 2019-10-02

## 2019-10-02 RX ORDER — TRIHEXYPHENIDYL HYDROCHLORIDE 2 MG/1
2 TABLET ORAL DAILY
Refills: 0 | Status: ACTIVE | COMMUNITY
Start: 2019-10-02

## 2019-10-02 RX ORDER — MESALAMINE 375 MG/1
0.38 CAPSULE, EXTENDED RELEASE ORAL DAILY
Refills: 0 | Status: ACTIVE | COMMUNITY
Start: 2019-10-02

## 2019-10-02 NOTE — HISTORY OF PRESENT ILLNESS
[de-identified] : the patient is a 79-year-old male with known Parkinson's disease and is status post myocardial infarction in 201 followed by cardiologywithout present problems. His major disability has been Parkinson's disease and he walks with a cane and has mild cognitive dysfunction. He takes care of all ADLs he has no chest pain palpitations swelling fainting or dyspnea He has chronic constipation for which he takes is Colace twice a day and magnesium citrate. He has nocturia for which she will take saw palmetto but no other  symptoms. He has not fallen. He is due for flu shot. Normally gets colonoscopy

## 2019-10-02 NOTE — HEALTH RISK ASSESSMENT
[Relationship: ___] : Relationship: [unfilled] [With Patient/Caregiver] : With Patient/Caregiver [DNR] : DNR [Comfort care only] : comfort care only [I will adhere to the patient's wishes as expressed in the advance directive except as noted below.] : I will adhere to the patient's wishes as expressed in the advance directive except as noted below [0] : 2) Feeling down, depressed, or hopeless: Not at all (0)

## 2019-10-02 NOTE — ASSESSMENT
[FreeTextEntry1] : he patient's major problem is his Parkinson's disease and has mild cognitive dysfunction, gait abnormalityand on and off phenomena especially when starting to walk. He is using medicine for constipation which is helping him and he is followed by cardiology All screening tests will be done.the EKG is within normal limits

## 2019-10-02 NOTE — PHYSICAL EXAM
[Normal Male:] : meatus normal, the bladder was normal on palpation, the scrotum was normal, there were no testicular masses and no prostate nodules. [Normal] : no CVA tenderness, no spinal tenderness [de-identified] : ilateral hearing decrease [de-identified] : S1 and S2 are slightly decreased in intensityand there is a 1/6 ejection murmur at the [FreeTextEntry1] : back is negative prostate is mildly enlarged but without nodules [de-identified] : no adenopathy [de-identified] : mildly kyphotic//Parkinsonian gait was stable slightly bent forward using a cane [de-identified] : significant [de-identified] : mild Parkinsonian tremor right greater than left/braddykinesis/Parkinsonian gait/mask facies

## 2019-10-03 LAB
ALBUMIN SERPL ELPH-MCNC: 4.3 G/DL
ALP BLD-CCNC: 77 U/L
ALT SERPL-CCNC: <5 U/L
ANION GAP SERPL CALC-SCNC: 12 MMOL/L
APPEARANCE: CLEAR
AST SERPL-CCNC: 15 U/L
BACTERIA: NEGATIVE
BASOPHILS # BLD AUTO: 0.04 K/UL
BASOPHILS NFR BLD AUTO: 0.6 %
BILIRUB SERPL-MCNC: 0.4 MG/DL
BILIRUBIN URINE: NEGATIVE
BLOOD URINE: NORMAL
BUN SERPL-MCNC: 27 MG/DL
CALCIUM SERPL-MCNC: 9.3 MG/DL
CHLORIDE SERPL-SCNC: 107 MMOL/L
CHOLEST SERPL-MCNC: 157 MG/DL
CHOLEST/HDLC SERPL: 2.3 RATIO
CO2 SERPL-SCNC: 26 MMOL/L
COLOR: YELLOW
CREAT SERPL-MCNC: 0.85 MG/DL
EOSINOPHIL # BLD AUTO: 0.13 K/UL
EOSINOPHIL NFR BLD AUTO: 2 %
GLUCOSE QUALITATIVE U: NEGATIVE
GLUCOSE SERPL-MCNC: 94 MG/DL
HCT VFR BLD CALC: 43.3 %
HDLC SERPL-MCNC: 68 MG/DL
HGB BLD-MCNC: 13.7 G/DL
HYALINE CASTS: 1 /LPF
IMM GRANULOCYTES NFR BLD AUTO: 0.2 %
KETONES URINE: NEGATIVE
LDLC SERPL CALC-MCNC: 79 MG/DL
LEUKOCYTE ESTERASE URINE: NEGATIVE
LYMPHOCYTES # BLD AUTO: 1.5 K/UL
LYMPHOCYTES NFR BLD AUTO: 23.4 %
MAN DIFF?: NORMAL
MCHC RBC-ENTMCNC: 30.5 PG
MCHC RBC-ENTMCNC: 31.6 GM/DL
MCV RBC AUTO: 96.4 FL
MICROSCOPIC-UA: NORMAL
MONOCYTES # BLD AUTO: 0.67 K/UL
MONOCYTES NFR BLD AUTO: 10.4 %
NEUTROPHILS # BLD AUTO: 4.07 K/UL
NEUTROPHILS NFR BLD AUTO: 63.4 %
NITRITE URINE: NEGATIVE
PH URINE: 6
PLATELET # BLD AUTO: 234 K/UL
POTASSIUM SERPL-SCNC: 4.5 MMOL/L
PROT SERPL-MCNC: 6.6 G/DL
PROTEIN URINE: NEGATIVE
RBC # BLD: 4.49 M/UL
RBC # FLD: 13.6 %
RED BLOOD CELLS URINE: 1 /HPF
SODIUM SERPL-SCNC: 145 MMOL/L
SPECIFIC GRAVITY URINE: 1.02
SQUAMOUS EPITHELIAL CELLS: 1 /HPF
T4 SERPL-MCNC: 6.7 UG/DL
TRIGL SERPL-MCNC: 48 MG/DL
TSH SERPL-ACNC: 1.19 UIU/ML
UROBILINOGEN URINE: NORMAL
WBC # FLD AUTO: 6.42 K/UL
WHITE BLOOD CELLS URINE: 3 /HPF

## 2019-11-04 ENCOUNTER — RX RENEWAL (OUTPATIENT)
Age: 79
End: 2019-11-04

## 2019-11-11 ENCOUNTER — EMERGENCY (EMERGENCY)
Facility: HOSPITAL | Age: 79
LOS: 1 days | Discharge: HOME | End: 2019-11-11
Attending: EMERGENCY MEDICINE | Admitting: EMERGENCY MEDICINE
Payer: MEDICARE

## 2019-11-11 VITALS
SYSTOLIC BLOOD PRESSURE: 109 MMHG | HEART RATE: 84 BPM | DIASTOLIC BLOOD PRESSURE: 70 MMHG | RESPIRATION RATE: 16 BRPM | TEMPERATURE: 99 F | OXYGEN SATURATION: 95 %

## 2019-11-11 VITALS
OXYGEN SATURATION: 99 % | SYSTOLIC BLOOD PRESSURE: 149 MMHG | HEART RATE: 83 BPM | TEMPERATURE: 98 F | HEIGHT: 71 IN | DIASTOLIC BLOOD PRESSURE: 69 MMHG | WEIGHT: 166.89 LBS | RESPIRATION RATE: 17 BRPM

## 2019-11-11 LAB
ALBUMIN SERPL ELPH-MCNC: 3.7 G/DL — SIGNIFICANT CHANGE UP (ref 3.3–5)
ALP SERPL-CCNC: 73 U/L — SIGNIFICANT CHANGE UP (ref 40–120)
ALT FLD-CCNC: 17 U/L — SIGNIFICANT CHANGE UP (ref 12–78)
ANION GAP SERPL CALC-SCNC: 6 MMOL/L — SIGNIFICANT CHANGE UP (ref 5–17)
APPEARANCE UR: CLEAR — SIGNIFICANT CHANGE UP
AST SERPL-CCNC: 17 U/L — SIGNIFICANT CHANGE UP (ref 15–37)
BACTERIA # UR AUTO: NEGATIVE — SIGNIFICANT CHANGE UP
BASOPHILS # BLD AUTO: 0.04 K/UL — SIGNIFICANT CHANGE UP (ref 0–0.2)
BASOPHILS NFR BLD AUTO: 0.6 % — SIGNIFICANT CHANGE UP (ref 0–2)
BILIRUB SERPL-MCNC: 0.4 MG/DL — SIGNIFICANT CHANGE UP (ref 0.2–1.2)
BILIRUB UR-MCNC: NEGATIVE — SIGNIFICANT CHANGE UP
BUN SERPL-MCNC: 25 MG/DL — HIGH (ref 7–23)
CALCIUM SERPL-MCNC: 8.9 MG/DL — SIGNIFICANT CHANGE UP (ref 8.5–10.1)
CHLORIDE SERPL-SCNC: 113 MMOL/L — HIGH (ref 96–108)
CO2 SERPL-SCNC: 27 MMOL/L — SIGNIFICANT CHANGE UP (ref 22–31)
COLOR SPEC: YELLOW — SIGNIFICANT CHANGE UP
COMMENT - URINE: SIGNIFICANT CHANGE UP
CREAT SERPL-MCNC: 0.97 MG/DL — SIGNIFICANT CHANGE UP (ref 0.5–1.3)
DIFF PNL FLD: ABNORMAL
EOSINOPHIL # BLD AUTO: 0.13 K/UL — SIGNIFICANT CHANGE UP (ref 0–0.5)
EOSINOPHIL NFR BLD AUTO: 2 % — SIGNIFICANT CHANGE UP (ref 0–6)
EPI CELLS # UR: SIGNIFICANT CHANGE UP
GLUCOSE SERPL-MCNC: 80 MG/DL — SIGNIFICANT CHANGE UP (ref 70–99)
GLUCOSE UR QL: NEGATIVE — SIGNIFICANT CHANGE UP
HCT VFR BLD CALC: 40.8 % — SIGNIFICANT CHANGE UP (ref 39–50)
HGB BLD-MCNC: 13.3 G/DL — SIGNIFICANT CHANGE UP (ref 13–17)
IMM GRANULOCYTES NFR BLD AUTO: 0.2 % — SIGNIFICANT CHANGE UP (ref 0–1.5)
KETONES UR-MCNC: ABNORMAL
LEUKOCYTE ESTERASE UR-ACNC: NEGATIVE — SIGNIFICANT CHANGE UP
LYMPHOCYTES # BLD AUTO: 1.78 K/UL — SIGNIFICANT CHANGE UP (ref 1–3.3)
LYMPHOCYTES # BLD AUTO: 27.9 % — SIGNIFICANT CHANGE UP (ref 13–44)
MCHC RBC-ENTMCNC: 30.4 PG — SIGNIFICANT CHANGE UP (ref 27–34)
MCHC RBC-ENTMCNC: 32.6 GM/DL — SIGNIFICANT CHANGE UP (ref 32–36)
MCV RBC AUTO: 93.2 FL — SIGNIFICANT CHANGE UP (ref 80–100)
MONOCYTES # BLD AUTO: 0.76 K/UL — SIGNIFICANT CHANGE UP (ref 0–0.9)
MONOCYTES NFR BLD AUTO: 11.9 % — SIGNIFICANT CHANGE UP (ref 2–14)
NEUTROPHILS # BLD AUTO: 3.65 K/UL — SIGNIFICANT CHANGE UP (ref 1.8–7.4)
NEUTROPHILS NFR BLD AUTO: 57.4 % — SIGNIFICANT CHANGE UP (ref 43–77)
NITRITE UR-MCNC: NEGATIVE — SIGNIFICANT CHANGE UP
NRBC # BLD: 0 /100 WBCS — SIGNIFICANT CHANGE UP (ref 0–0)
PH UR: 5 — SIGNIFICANT CHANGE UP (ref 5–8)
PLATELET # BLD AUTO: 208 K/UL — SIGNIFICANT CHANGE UP (ref 150–400)
POTASSIUM SERPL-MCNC: 3.9 MMOL/L — SIGNIFICANT CHANGE UP (ref 3.5–5.3)
POTASSIUM SERPL-SCNC: 3.9 MMOL/L — SIGNIFICANT CHANGE UP (ref 3.5–5.3)
PROT SERPL-MCNC: 7 G/DL — SIGNIFICANT CHANGE UP (ref 6–8.3)
PROT UR-MCNC: NEGATIVE — SIGNIFICANT CHANGE UP
RBC # BLD: 4.38 M/UL — SIGNIFICANT CHANGE UP (ref 4.2–5.8)
RBC # FLD: 13.2 % — SIGNIFICANT CHANGE UP (ref 10.3–14.5)
RBC CASTS # UR COMP ASSIST: SIGNIFICANT CHANGE UP /HPF (ref 0–4)
SODIUM SERPL-SCNC: 146 MMOL/L — HIGH (ref 135–145)
SP GR SPEC: 1.01 — SIGNIFICANT CHANGE UP (ref 1.01–1.02)
TROPONIN I SERPL-MCNC: <.015 NG/ML — SIGNIFICANT CHANGE UP (ref 0.01–0.04)
UROBILINOGEN FLD QL: NEGATIVE — SIGNIFICANT CHANGE UP
WBC # BLD: 6.37 K/UL — SIGNIFICANT CHANGE UP (ref 3.8–10.5)
WBC # FLD AUTO: 6.37 K/UL — SIGNIFICANT CHANGE UP (ref 3.8–10.5)
WBC UR QL: SIGNIFICANT CHANGE UP

## 2019-11-11 PROCEDURE — 81001 URINALYSIS AUTO W/SCOPE: CPT

## 2019-11-11 PROCEDURE — 99284 EMERGENCY DEPT VISIT MOD MDM: CPT | Mod: 25

## 2019-11-11 PROCEDURE — 71045 X-RAY EXAM CHEST 1 VIEW: CPT

## 2019-11-11 PROCEDURE — 71045 X-RAY EXAM CHEST 1 VIEW: CPT | Mod: 26

## 2019-11-11 PROCEDURE — 85027 COMPLETE CBC AUTOMATED: CPT

## 2019-11-11 PROCEDURE — 70450 CT HEAD/BRAIN W/O DYE: CPT | Mod: 26

## 2019-11-11 PROCEDURE — 93010 ELECTROCARDIOGRAM REPORT: CPT

## 2019-11-11 PROCEDURE — 96374 THER/PROPH/DIAG INJ IV PUSH: CPT

## 2019-11-11 PROCEDURE — 80053 COMPREHEN METABOLIC PANEL: CPT

## 2019-11-11 PROCEDURE — 70450 CT HEAD/BRAIN W/O DYE: CPT

## 2019-11-11 PROCEDURE — 99285 EMERGENCY DEPT VISIT HI MDM: CPT

## 2019-11-11 PROCEDURE — 84484 ASSAY OF TROPONIN QUANT: CPT

## 2019-11-11 PROCEDURE — 36415 COLL VENOUS BLD VENIPUNCTURE: CPT

## 2019-11-11 PROCEDURE — 93005 ELECTROCARDIOGRAM TRACING: CPT

## 2019-11-11 RX ORDER — CARBIDOPA AND LEVODOPA 25; 100 MG/1; MG/1
2 TABLET ORAL ONCE
Refills: 0 | Status: COMPLETED | OUTPATIENT
Start: 2019-11-11 | End: 2019-11-11

## 2019-11-11 RX ORDER — ONDANSETRON 8 MG/1
4 TABLET, FILM COATED ORAL ONCE
Refills: 0 | Status: COMPLETED | OUTPATIENT
Start: 2019-11-11 | End: 2019-11-11

## 2019-11-11 RX ORDER — TRIHEXYPHENIDYL HCL 2 MG
2 TABLET ORAL ONCE
Refills: 0 | Status: COMPLETED | OUTPATIENT
Start: 2019-11-11 | End: 2019-11-11

## 2019-11-11 RX ADMIN — CARBIDOPA AND LEVODOPA 2 TABLET(S): 25; 100 TABLET ORAL at 18:20

## 2019-11-11 RX ADMIN — ONDANSETRON 4 MILLIGRAM(S): 8 TABLET, FILM COATED ORAL at 20:14

## 2019-11-11 RX ADMIN — Medication 2 MILLIGRAM(S): at 18:19

## 2019-11-11 NOTE — ED PROVIDER NOTE - MUSCULOSKELETAL, MLM
pt has well healed low back midline scar, walks with kyphosis antalgic gait assisted with cane and is still unsteady (per baseline per pt-stating slow gait is worse than a fast one)

## 2019-11-11 NOTE — ED PROVIDER NOTE - CHPI ED SYMPTOMS NEG
no blurred vision/no numbness/no vomiting/no loss of consciousness/no nausea/no weakness/no change in level of consciousness/no confusion/no fever

## 2019-11-11 NOTE — ED ADULT NURSE NOTE - NSIMPLEMENTINTERV_GEN_ALL_ED
Implemented All Fall Risk Interventions:  [place] to call system. Call bell, personal items and telephone within reach. Instruct patient to call for assistance. Room bathroom lighting operational. Non-slip footwear when patient is off stretcher. Physically safe environment: no spills, clutter or unnecessary equipment. Stretcher in lowest position, wheels locked, appropriate side rails in place. Provide visual cue, wrist band, yellow gown, etc. Monitor gait and stability. Monitor for mental status changes and reorient to person, place, and time. Review medications for side effects contributing to fall risk. Reinforce activity limits and safety measures with patient and family.

## 2019-11-11 NOTE — ED ADULT NURSE NOTE - PMH
CAD (coronary artery disease)    CAD (Coronary Artery Disease)  2001, s/p cardiac cath with stent 2001  Constipation    Hyperlipidemia  2006  Inguinal hernia  right  Parkinson Disease  2009  Renal Calculi  1984

## 2019-11-11 NOTE — ED PROVIDER NOTE - CLINICAL SUMMARY MEDICAL DECISION MAKING FREE TEXT BOX
light headed after skipping his medicines feeling anxious now wants evaluation no other sx ro metabolic ro neurologic provide meds as noncompliance may cause neuro sx ekg

## 2019-11-11 NOTE — ED PROVIDER NOTE - OBJECTIVE STATEMENT
pt is a 80 yo male who has parkinsons disease, cad sp mi renal colic neuro dr barry diego pmd dr cuong montes remote former smoker.  he accidently missed his lunch time meds because he had gone out to eat and did not take them with him.  he now reports feeling light headed no dizzy no cp no sob no diaphoresis but is worried.  Then he got up to go for a walk (typically unsteady due to pd uses cane) and reports he felt sob so he decided to come to er for evaluation.  never had similar sx previously

## 2019-11-11 NOTE — ED PROVIDER NOTE - PATIENT PORTAL LINK FT
You can access the FollowMyHealth Patient Portal offered by [facility] by registering at the following website: [email or web address]. By joining [facility]’s FollowMyHealth portal, you will also be able to view your health information using other applications (apps) compatible with our system.

## 2020-01-02 NOTE — ASU PATIENT PROFILE, ADULT - REASON FOR ADMISSION, PROFILE
Department of Anesthesiology  Preprocedure Note       Name:  Katie Bone   Age:  82 y.o.  :  1932                                          MRN:  [id number]         Date:  1/3/2020      Surgeon: Elmira Shafer):  Tammy Liu DDS    Procedure: DENTAL RESTORATIONS (N/A Mouth)    Medications prior to admission:   Prior to Admission medications    Medication Sig Start Date End Date Taking? Authorizing Provider   loratadine (CLARITIN) 10 MG capsule Take 10 mg by mouth daily prn   Yes Historical Provider, MD   predniSONE (DELTASONE) 10 MG tablet Take 10 mg by mouth daily Instructed to take morning of surgery with a sip of water   Yes Historical Provider, MD   levothyroxine (SYNTHROID) 100 MCG tablet Take 1 tablet by mouth daily 19  Yes Anirudh Cruz MD   hydroxychloroquine (PLAQUENIL) 200 MG tablet TAKE 1 TABLET BY MOUTH TWICE A DAY 19  Yes Desean Nelson MD   sulfaSALAzine (AZULFIDINE) 500 MG tablet TAKE 1 TABLET BY MOUTH TWICE A DAY 19  Yes Desean Nelson MD   pravastatin (PRAVACHOL) 40 MG tablet TAKE 1 TABLET BY MOUTH EVERY DAY 19  Yes Desean Nelson MD   meclizine (ANTIVERT) 12.5 MG tablet TAKE 1 TABLET BY MOUTH EVERY DAY 19  Yes Desean Nelson MD   amLODIPine (NORVASC) 5 MG tablet Take 1 tablet by mouth daily 19  Yes Desean Nelson MD   pantoprazole (PROTONIX) 40 MG tablet Take 1 tablet by mouth daily (with breakfast) 19  Yes Desean Nelson MD   DULoxetine (CYMBALTA) 60 MG extended release capsule TAKE ONE CAPSULE BY MOUTH EVERY DAY 10/29/18  Yes Desean Nelson MD   vitamin D (CHOLECALCIFEROL) 1000 UNIT TABS tablet Take 1 tablet by mouth daily.  2/15/13  Yes Anirudh Meter, MD   carbamide peroxide (DEBROX) 6.5 % otic solution Place 5 drops into both ears 3 times daily Every 4 weeks    Historical Provider, MD   Magnesium Hydroxide (MILK OF MAGNESIA PO) Take 30 mLs by mouth as needed    Historical Provider, MD   mineral oil-hydrophilic spouse. Plan discussed with CRNA. German Saldana MD   1/3/2020      DOS STAFF ADDENDUM:    Pt seen and examined, physical exam updated, chart reviewed including anesthesia, drug and allergy history. H&P reviewed. No interval changes to history or physical examination (unless noted above). NPO status confirmed. Anesthetic plan, risks, benefits, alternatives discussed with patient. Patient verbalized an understanding and agrees to proceed.      German Saldana MD  Staff Anesthesiologist  9:34 AM inguinal hernia repair

## 2020-01-04 ENCOUNTER — RECORD ABSTRACTING (OUTPATIENT)
Age: 80
End: 2020-01-04

## 2020-01-04 DIAGNOSIS — Z87.19 PERSONAL HISTORY OF OTHER DISEASES OF THE DIGESTIVE SYSTEM: ICD-10-CM

## 2020-01-04 DIAGNOSIS — K86.3 CYST OF PANCREAS: ICD-10-CM

## 2020-01-04 DIAGNOSIS — Z83.3 FAMILY HISTORY OF DIABETES MELLITUS: ICD-10-CM

## 2020-01-04 DIAGNOSIS — N40.1 BENIGN PROSTATIC HYPERPLASIA WITH LOWER URINARY TRACT SYMPMS: ICD-10-CM

## 2020-01-04 DIAGNOSIS — Z86.69 PERSONAL HISTORY OF OTHER DISEASES OF THE NERVOUS SYSTEM AND SENSE ORGANS: ICD-10-CM

## 2020-01-04 DIAGNOSIS — Z78.9 OTHER SPECIFIED HEALTH STATUS: ICD-10-CM

## 2020-01-04 DIAGNOSIS — Z87.09 PERSONAL HISTORY OF OTHER DISEASES OF THE RESPIRATORY SYSTEM: ICD-10-CM

## 2020-01-04 DIAGNOSIS — K86.2 CYST OF PANCREAS: ICD-10-CM

## 2020-01-04 DIAGNOSIS — Z86.79 PERSONAL HISTORY OF OTHER DISEASES OF THE CIRCULATORY SYSTEM: ICD-10-CM

## 2020-01-04 DIAGNOSIS — Z87.898 PERSONAL HISTORY OF OTHER SPECIFIED CONDITIONS: ICD-10-CM

## 2020-01-04 DIAGNOSIS — N20.0 CALCULUS OF KIDNEY: ICD-10-CM

## 2020-01-04 DIAGNOSIS — Z87.39 PERSONAL HISTORY OF OTHER DISEASES OF THE MUSCULOSKELETAL SYSTEM AND CONNECTIVE TISSUE: ICD-10-CM

## 2020-01-04 DIAGNOSIS — Z85.07 PERSONAL HISTORY OF MALIGNANT NEOPLASM OF PANCREAS: ICD-10-CM

## 2020-01-04 DIAGNOSIS — Z86.39 PERSONAL HISTORY OF OTHER ENDOCRINE, NUTRITIONAL AND METABOLIC DISEASE: ICD-10-CM

## 2020-01-04 DIAGNOSIS — N13.8 BENIGN PROSTATIC HYPERPLASIA WITH LOWER URINARY TRACT SYMPMS: ICD-10-CM

## 2020-01-04 DIAGNOSIS — Z86.010 PERSONAL HISTORY OF COLONIC POLYPS: ICD-10-CM

## 2020-01-04 RX ORDER — MULTIVIT-MIN/FA/LYCOPEN/LUTEIN .4-300-25
TABLET ORAL
Refills: 0 | Status: ACTIVE | COMMUNITY

## 2020-01-04 RX ORDER — UBIDECARENONE 200 MG
200 CAPSULE ORAL
Refills: 0 | Status: ACTIVE | COMMUNITY

## 2020-04-13 ENCOUNTER — RX RENEWAL (OUTPATIENT)
Age: 80
End: 2020-04-13

## 2020-04-20 ENCOUNTER — APPOINTMENT (OUTPATIENT)
Dept: INTERNAL MEDICINE | Facility: CLINIC | Age: 80
End: 2020-04-20

## 2020-07-01 ENCOUNTER — APPOINTMENT (OUTPATIENT)
Dept: INTERNAL MEDICINE | Facility: CLINIC | Age: 80
End: 2020-07-01

## 2020-07-01 ENCOUNTER — APPOINTMENT (OUTPATIENT)
Dept: INTERNAL MEDICINE | Facility: CLINIC | Age: 80
End: 2020-07-01
Payer: MEDICARE

## 2020-07-01 PROCEDURE — 99441: CPT | Mod: 95

## 2020-07-07 ENCOUNTER — RX RENEWAL (OUTPATIENT)
Age: 80
End: 2020-07-07

## 2020-07-07 RX ORDER — MIRTAZAPINE 7.5 MG/1
7.5 TABLET, FILM COATED ORAL
Qty: 90 | Refills: 3 | Status: ACTIVE | COMMUNITY
Start: 2019-10-02 | End: 1900-01-01

## 2020-08-04 NOTE — ED PROVIDER NOTE - PSH
S/P Cardiac Cath  2001 with stent, 2002  S/P Cystoscopy  X3, ESWL 1984  S/P Lumbar Discectomy  1995 39

## 2020-09-17 ENCOUNTER — APPOINTMENT (OUTPATIENT)
Dept: INTERNAL MEDICINE | Facility: CLINIC | Age: 80
End: 2020-09-17
Payer: MEDICARE

## 2020-09-17 VITALS
SYSTOLIC BLOOD PRESSURE: 100 MMHG | TEMPERATURE: 98 F | DIASTOLIC BLOOD PRESSURE: 60 MMHG | HEART RATE: 81 BPM | WEIGHT: 160 LBS | OXYGEN SATURATION: 97 %

## 2020-09-17 DIAGNOSIS — I25.10 ATHEROSCLEROTIC HEART DISEASE OF NATIVE CORONARY ARTERY W/OUT ANGINA PECTORIS: ICD-10-CM

## 2020-09-17 DIAGNOSIS — Z23 ENCOUNTER FOR IMMUNIZATION: ICD-10-CM

## 2020-09-17 DIAGNOSIS — E78.5 HYPERLIPIDEMIA, UNSPECIFIED: ICD-10-CM

## 2020-09-17 DIAGNOSIS — G20 PARKINSON'S DISEASE: ICD-10-CM

## 2020-09-17 DIAGNOSIS — I25.2 OLD MYOCARDIAL INFARCTION: ICD-10-CM

## 2020-09-17 PROCEDURE — G0008: CPT

## 2020-09-17 PROCEDURE — 36415 COLL VENOUS BLD VENIPUNCTURE: CPT

## 2020-09-17 PROCEDURE — 99214 OFFICE O/P EST MOD 30 MIN: CPT | Mod: 25

## 2020-09-17 PROCEDURE — 90662 IIV NO PRSV INCREASED AG IM: CPT

## 2020-09-17 NOTE — HISTORY OF PRESENT ILLNESS
Chief complaint:   Chief Complaint   Patient presents with   • Knee Pain     L X 1 YEAR       Vitals:  Visit Vitals  /62 (BP Location: LUE - Left upper extremity, Patient Position: Sitting, Cuff Size: Large Adult)   Pulse 74   Temp 98.3 °F (36.8 °C) (Oral)   Ht 5' 4\" (1.626 m)   Wt 99.8 kg   Breastfeeding No   BMI 37.76 kg/m²       HISTORY OF PRESENT ILLNESS     HPI  New patient 65 yo, changing from [facility] PCP  C/O left knee chronic pain since 2/2019. Previous PCP recommended PT and it was not improving,. In Octomber her PCP suspected a back problem being the cause of her knee pain. She had a MRI of L spine which was abnrmal. It showed:    1. Multilevel degenerative images most prominent at L2-L3  and L3-L4.  2. Right foraminal disc herniation at L5-S1 with some  compression of the right L5 nerve root.  3. Background of multilevel circumferential disc bulging and bilateral facet arthropathy with no critical spinal canal narrowing\"  She continued to have pain on the inside cur irradiation to the popliteal area  She ended up to see Dr Watkins with [facility] He did a n MRI of left knee which was also abnormal  \"1. Small joint effusion, with a small Baker's cyst.  2. Prominent chondromalacia patella, to include full-thickness loss. Modest chondromalacia elsewhere.  3. Complex tear of the posterior horn of the medial meniscus\"   She did have an left knee arthroscopic partial meniscectomy in 12/19/2019, done by dr Lisandro Atkinson   A month later her pain reocurred, in the same area, medial with radiation to the popliteal fossa, and local swelling, therfore she had a US of  left leg which was negative for DVT but found a small Baker cyst.   She has debts $ 9000 and refused any more images  She continues to have pain popliteal and medial. She did yoga, and PT but hed to give up because of costs   She is retired for 2 years, did a physical job  She kept complaining about her previous PCP who concentrated  [de-identified] : 80 M w parkinson's d who can walk w walker and do all ADL.s but is overall dteriorationg and having cognitive pb. OLN statin,asa plavix.No cp sympt For flu shot. more on the lower back than the left leg pain, and then on the orthopedic, that after the surgery she is still in pain  2. PMH  Hyperlipidemia, last labs in 10/19, good   • Vitamin D deficiency, last level 26.2 in 10/19   • Nephrolithiasis   • IBS (irritable bowel syndrome)   • Eczema   • Degenerative joint disease of right hip  Anxiety  Neck pain 3/08     Other significant problems:  Patient Active Problem List    Diagnosis Date Noted   • Seasonal allergies 05/10/2016     Priority: Low   • Degenerative joint disease of right hip 2014     Priority: Low   • Strain of abdominal wall 2014     Priority: Low   • Nicotine dependence 2014     Priority: Low   • Hypercholesterolemia 2013     Priority: Low       PAST MEDICAL, FAMILY AND SOCIAL HISTORY     Medications:  Current Outpatient Medications   Medication   • simvastatin (ZOCOR) 40 MG tablet   • hydrocortisone (CORTIZONE) 2.5 % cream     No current facility-administered medications for this visit.        Allergies:  ALLERGIES:   Allergen Reactions   • Penicillin G HIVES       Past Medical  History/Surgeries:  Past Medical History:   Diagnosis Date   • Allergy    • Anxiety     About flights   • DDD (degenerative disc disease)     Hyperostosis C-spine   • Eczema    • Hyperlipidemia    • IBS (irritable bowel syndrome)    • Varicose veins     Left ankle   • Vitamin D deficiency        Past Surgical History:   Procedure Laterality Date   • Appendectomy     • Colonoscopy  2016    5 yr repeat, precancerous polyp   • Kidney stone surgery     • Rectal surgery         Family History:  Family History   Problem Relation Age of Onset   • Parkinsonism Mother         Alive at age 88   • Stroke Father          at age 74       Social History:  Social History     Tobacco Use   • Smoking status: Former Smoker     Packs/day: 1.00     Years: 20.00     Pack years: 20.00     Types: Cigarettes   • Smokeless tobacco: Never Used   • Tobacco comment: pt denies  additional info   Substance Use Topics   • Alcohol use: Yes     Alcohol/week: 0.0 standard drinks     Comment: socially       REVIEW OF SYSTEMS     Review of Systems   Musculoskeletal: Positive for arthralgias.        Left knee pain and swelling    All other systems reviewed and are negative.      PHYSICAL EXAM     Physical Exam  Vitals signs and nursing note reviewed.   Constitutional:       Appearance: She is well-developed. She is obese.   HENT:      Right Ear: External ear normal.      Left Ear: External ear normal.      Nose: Nose normal.      Mouth/Throat:      Pharynx: No oropharyngeal exudate.   Eyes:      Conjunctiva/sclera: Conjunctivae normal.      Pupils: Pupils are equal, round, and reactive to light.   Neck:      Musculoskeletal: Normal range of motion and neck supple.   Cardiovascular:      Rate and Rhythm: Normal rate.      Pulses: Normal pulses.      Heart sounds: Normal heart sounds.   Pulmonary:      Effort: Pulmonary effort is normal.      Breath sounds: Normal breath sounds.   Abdominal:      General: Bowel sounds are normal.      Palpations: Abdomen is soft.   Musculoskeletal:         General: Swelling and tenderness present.      Comments: Left knee mild swollen, 2 punctiform scars, tender to palpation on the medial and popliteal fossa, decreased flexion and extension . Uses a cane    Lymphadenopathy:      Cervical: No cervical adenopathy.   Skin:     General: Skin is warm.   Neurological:      Mental Status: She is alert and oriented to person, place, and time.         ASSESSMENT/PLAN     Lamar was seen today for knee pain.    Diagnoses and all orders for this visit:    Encounter to establish care    Hypercholesterolemia  On Simvastatin 40 mg     Obesity, BMI 37.7.  She gain even more weight since she is retired for 2 years, recommended to be more aggressive with diet, daily exercises    Chronic pain of left knee  She is 3 months after the surgery, partial medial meniscectomy, she is still in  pain with decreased mobility, keeps complaining about \"why the baker cyst was not removed?\"  In my opinion, her knee just takes longer to heal, she will need to continue to wear the compression support she was actually advised to wear, and maybe PT. Recommended to keep appointment with the orthopedic     H/O arthroscopy of left knee    Spondylosis of lumbar region without myelopathy or radiculopathy    Patient signed release of information for orthopedic office and [facility]    Addendum  Received back medical notes and surgery notes from [facility]   Reviewed the notes and Dr. Watkins just seen the patient on 2/4/20.  In his opinion, the swelling of the left lower extremity is \"likely to be due to the surgery and its usual and expected\"

## 2020-09-18 LAB
ALBUMIN SERPL ELPH-MCNC: 4.2 G/DL
ALP BLD-CCNC: 65 U/L
ALT SERPL-CCNC: <5 U/L
ANION GAP SERPL CALC-SCNC: 11 MMOL/L
AST SERPL-CCNC: 16 U/L
BASOPHILS # BLD AUTO: 0.05 K/UL
BASOPHILS NFR BLD AUTO: 0.8 %
BILIRUB SERPL-MCNC: 0.6 MG/DL
BUN SERPL-MCNC: 24 MG/DL
CALCIUM SERPL-MCNC: 9.7 MG/DL
CHLORIDE SERPL-SCNC: 108 MMOL/L
CHOLEST SERPL-MCNC: 138 MG/DL
CHOLEST/HDLC SERPL: 2.2 RATIO
CK SERPL-CCNC: 68 U/L
CO2 SERPL-SCNC: 24 MMOL/L
CREAT SERPL-MCNC: 0.89 MG/DL
CRP SERPL HS-MCNC: 0.27 MG/L
EOSINOPHIL # BLD AUTO: 0.09 K/UL
EOSINOPHIL NFR BLD AUTO: 1.4 %
GLUCOSE SERPL-MCNC: 99 MG/DL
HCT VFR BLD CALC: 40.5 %
HDLC SERPL-MCNC: 63 MG/DL
HGB BLD-MCNC: 13.1 G/DL
IMM GRANULOCYTES NFR BLD AUTO: 0 %
LDLC SERPL CALC-MCNC: 63 MG/DL
LYMPHOCYTES # BLD AUTO: 1.39 K/UL
LYMPHOCYTES NFR BLD AUTO: 22.2 %
MAN DIFF?: NORMAL
MCHC RBC-ENTMCNC: 30.9 PG
MCHC RBC-ENTMCNC: 32.3 GM/DL
MCV RBC AUTO: 95.5 FL
MONOCYTES # BLD AUTO: 0.58 K/UL
MONOCYTES NFR BLD AUTO: 9.3 %
NEUTROPHILS # BLD AUTO: 4.15 K/UL
NEUTROPHILS NFR BLD AUTO: 66.3 %
PLATELET # BLD AUTO: 210 K/UL
POTASSIUM SERPL-SCNC: 4.1 MMOL/L
PROT SERPL-MCNC: 6.4 G/DL
RBC # BLD: 4.24 M/UL
RBC # FLD: 13.3 %
SODIUM SERPL-SCNC: 143 MMOL/L
TRIGL SERPL-MCNC: 62 MG/DL
WBC # FLD AUTO: 6.26 K/UL

## 2020-11-09 ENCOUNTER — RX RENEWAL (OUTPATIENT)
Age: 80
End: 2020-11-09

## 2021-03-23 ENCOUNTER — NON-APPOINTMENT (OUTPATIENT)
Age: 81
End: 2021-03-23

## 2021-03-28 ENCOUNTER — EMERGENCY (EMERGENCY)
Facility: HOSPITAL | Age: 81
LOS: 1 days | Discharge: HOME | End: 2021-03-28
Attending: EMERGENCY MEDICINE | Admitting: EMERGENCY MEDICINE
Payer: MEDICARE

## 2021-03-28 VITALS
RESPIRATION RATE: 18 BRPM | SYSTOLIC BLOOD PRESSURE: 127 MMHG | WEIGHT: 164.91 LBS | OXYGEN SATURATION: 98 % | DIASTOLIC BLOOD PRESSURE: 76 MMHG | HEART RATE: 83 BPM | TEMPERATURE: 98 F | HEIGHT: 71 IN

## 2021-03-28 LAB
ALBUMIN SERPL ELPH-MCNC: 3.7 G/DL — SIGNIFICANT CHANGE UP (ref 3.3–5)
ALP SERPL-CCNC: 72 U/L — SIGNIFICANT CHANGE UP (ref 40–120)
ALT FLD-CCNC: 8 U/L — LOW (ref 12–78)
ANION GAP SERPL CALC-SCNC: 4 MMOL/L — LOW (ref 5–17)
APPEARANCE UR: ABNORMAL
APTT BLD: 31.6 SEC — SIGNIFICANT CHANGE UP (ref 27.5–35.5)
AST SERPL-CCNC: 16 U/L — SIGNIFICANT CHANGE UP (ref 15–37)
BASOPHILS # BLD AUTO: 0.02 K/UL — SIGNIFICANT CHANGE UP (ref 0–0.2)
BASOPHILS NFR BLD AUTO: 0.3 % — SIGNIFICANT CHANGE UP (ref 0–2)
BILIRUB SERPL-MCNC: 0.6 MG/DL — SIGNIFICANT CHANGE UP (ref 0.2–1.2)
BILIRUB UR-MCNC: NEGATIVE — SIGNIFICANT CHANGE UP
BUN SERPL-MCNC: 25 MG/DL — HIGH (ref 7–23)
CALCIUM SERPL-MCNC: 9.1 MG/DL — SIGNIFICANT CHANGE UP (ref 8.5–10.1)
CHLORIDE SERPL-SCNC: 113 MMOL/L — HIGH (ref 96–108)
CO2 SERPL-SCNC: 27 MMOL/L — SIGNIFICANT CHANGE UP (ref 22–31)
COLOR SPEC: ABNORMAL
CREAT SERPL-MCNC: 0.95 MG/DL — SIGNIFICANT CHANGE UP (ref 0.5–1.3)
DIFF PNL FLD: ABNORMAL
EOSINOPHIL # BLD AUTO: 0.15 K/UL — SIGNIFICANT CHANGE UP (ref 0–0.5)
EOSINOPHIL NFR BLD AUTO: 2.5 % — SIGNIFICANT CHANGE UP (ref 0–6)
GLUCOSE SERPL-MCNC: 101 MG/DL — HIGH (ref 70–99)
GLUCOSE UR QL: NEGATIVE — SIGNIFICANT CHANGE UP
HCT VFR BLD CALC: 42.5 % — SIGNIFICANT CHANGE UP (ref 39–50)
HGB BLD-MCNC: 13.7 G/DL — SIGNIFICANT CHANGE UP (ref 13–17)
IMM GRANULOCYTES NFR BLD AUTO: 0.3 % — SIGNIFICANT CHANGE UP (ref 0–1.5)
INR BLD: 1.07 RATIO — SIGNIFICANT CHANGE UP (ref 0.88–1.16)
KETONES UR-MCNC: ABNORMAL
LEUKOCYTE ESTERASE UR-ACNC: ABNORMAL
LYMPHOCYTES # BLD AUTO: 1.4 K/UL — SIGNIFICANT CHANGE UP (ref 1–3.3)
LYMPHOCYTES # BLD AUTO: 22.9 % — SIGNIFICANT CHANGE UP (ref 13–44)
MCHC RBC-ENTMCNC: 30.1 PG — SIGNIFICANT CHANGE UP (ref 27–34)
MCHC RBC-ENTMCNC: 32.2 GM/DL — SIGNIFICANT CHANGE UP (ref 32–36)
MCV RBC AUTO: 93.4 FL — SIGNIFICANT CHANGE UP (ref 80–100)
MONOCYTES # BLD AUTO: 0.6 K/UL — SIGNIFICANT CHANGE UP (ref 0–0.9)
MONOCYTES NFR BLD AUTO: 9.8 % — SIGNIFICANT CHANGE UP (ref 2–14)
NEUTROPHILS # BLD AUTO: 3.93 K/UL — SIGNIFICANT CHANGE UP (ref 1.8–7.4)
NEUTROPHILS NFR BLD AUTO: 64.2 % — SIGNIFICANT CHANGE UP (ref 43–77)
NITRITE UR-MCNC: NEGATIVE — SIGNIFICANT CHANGE UP
NRBC # BLD: 0 /100 WBCS — SIGNIFICANT CHANGE UP (ref 0–0)
PH UR: 6.5 — SIGNIFICANT CHANGE UP (ref 5–8)
PLATELET # BLD AUTO: 228 K/UL — SIGNIFICANT CHANGE UP (ref 150–400)
POTASSIUM SERPL-MCNC: 4.3 MMOL/L — SIGNIFICANT CHANGE UP (ref 3.5–5.3)
POTASSIUM SERPL-SCNC: 4.3 MMOL/L — SIGNIFICANT CHANGE UP (ref 3.5–5.3)
PROT SERPL-MCNC: 7.4 G/DL — SIGNIFICANT CHANGE UP (ref 6–8.3)
PROT UR-MCNC: 100
PROTHROM AB SERPL-ACNC: 12.5 SEC — SIGNIFICANT CHANGE UP (ref 10.6–13.6)
RBC # BLD: 4.55 M/UL — SIGNIFICANT CHANGE UP (ref 4.2–5.8)
RBC # FLD: 13.2 % — SIGNIFICANT CHANGE UP (ref 10.3–14.5)
SODIUM SERPL-SCNC: 144 MMOL/L — SIGNIFICANT CHANGE UP (ref 135–145)
SP GR SPEC: 1.01 — SIGNIFICANT CHANGE UP (ref 1.01–1.02)
UROBILINOGEN FLD QL: NEGATIVE — SIGNIFICANT CHANGE UP
WBC # BLD: 6.12 K/UL — SIGNIFICANT CHANGE UP (ref 3.8–10.5)
WBC # FLD AUTO: 6.12 K/UL — SIGNIFICANT CHANGE UP (ref 3.8–10.5)

## 2021-03-28 PROCEDURE — 74176 CT ABD & PELVIS W/O CONTRAST: CPT

## 2021-03-28 PROCEDURE — 87086 URINE CULTURE/COLONY COUNT: CPT

## 2021-03-28 PROCEDURE — 85025 COMPLETE CBC W/AUTO DIFF WBC: CPT

## 2021-03-28 PROCEDURE — 36415 COLL VENOUS BLD VENIPUNCTURE: CPT

## 2021-03-28 PROCEDURE — 99284 EMERGENCY DEPT VISIT MOD MDM: CPT

## 2021-03-28 PROCEDURE — 85730 THROMBOPLASTIN TIME PARTIAL: CPT

## 2021-03-28 PROCEDURE — 80053 COMPREHEN METABOLIC PANEL: CPT

## 2021-03-28 PROCEDURE — 85610 PROTHROMBIN TIME: CPT

## 2021-03-28 PROCEDURE — 81001 URINALYSIS AUTO W/SCOPE: CPT

## 2021-03-28 PROCEDURE — 99284 EMERGENCY DEPT VISIT MOD MDM: CPT | Mod: 25

## 2021-03-28 PROCEDURE — 74176 CT ABD & PELVIS W/O CONTRAST: CPT | Mod: 26,MA

## 2021-03-28 RX ORDER — CEFUROXIME AXETIL 250 MG
500 TABLET ORAL ONCE
Refills: 0 | Status: COMPLETED | OUTPATIENT
Start: 2021-03-28 | End: 2021-03-28

## 2021-03-28 RX ORDER — CEFUROXIME AXETIL 250 MG
1 TABLET ORAL
Qty: 20 | Refills: 0
Start: 2021-03-28 | End: 2021-04-06

## 2021-03-28 RX ORDER — TAMSULOSIN HYDROCHLORIDE 0.4 MG/1
1 CAPSULE ORAL
Qty: 7 | Refills: 0
Start: 2021-03-28 | End: 2021-04-26

## 2021-03-28 RX ADMIN — Medication 500 MILLIGRAM(S): at 09:07

## 2021-03-28 NOTE — ED ADULT NURSE NOTE - OBJECTIVE STATEMENT
Received pt awake and alert, c/o two episodes of hematuria this morning, bright red blood, denies clots in urine, denies dysuria, on ASA and Plavix, respirations even and unlabored, positioned for comfort, spouse at bedside, will continue to monitor closely.

## 2021-03-28 NOTE — ED PROVIDER NOTE - OBJECTIVE STATEMENT
80 y/o M with hx of Parkinson Disease presents with wife for two episodes of hematuria.  pt denies any other symptoms.

## 2021-03-28 NOTE — ED ADULT NURSE NOTE - NSFALLRSKASSESSDT_ED_ALL_ED
Pre-application: Motor, sensory, and vascular responses intact in the injured extremity./Post-application: Motor, sensory, and vascular responses intact in the injured extremity./The patient/caregiver verbalized understanding of how to care for the injured extremity with splint
28-Mar-2021 07:57

## 2021-03-28 NOTE — ED ADULT NURSE NOTE - PSH
S/P Cardiac Cath  2001 with stent, 2002  S/P Cystoscopy  X3, ESWL 1984  S/P Lumbar Discectomy  1995

## 2021-03-28 NOTE — ED ADULT NURSE NOTE - NS ED NOTE ABUSE RESPONSE YN
"Chief Complaint   Patient presents with     Abdominal Pain     pt is here to discuss abdominal pain x 2 weeks        Vital signs:      BP: 105/68 Pulse: 60   Resp: 16         Weight: 62.6 kg (138 lb)  Estimated body mass index is 19.76 kg/m  as calculated from the following:    Height as of 7/19/18: 1.78 m (5' 10.08\").    Weight as of this encounter: 62.6 kg (138 lb).      Magda Matute CMA at 7:50 AM on 5/14/2019  " Yes

## 2021-03-28 NOTE — ED PROVIDER NOTE - CARE PROVIDER_API CALL
Raul Chapin  UROLOGY  [address] [place] [address], [address]  [place] [address]  Phone: [phone]  Fax: [phone]  Follow Up Time:

## 2021-03-28 NOTE — ED PROVIDER NOTE - PATIENT PORTAL LINK FT
You can access the FollowMyHealth Patient Portal offered by [facility] by registering at the following website: [email or web address]. By joining [email or web address]’s FollowMyHealth portal, you will also be able to view your health information using other applications (apps) compatible with our system.

## 2021-03-29 LAB
CULTURE RESULTS: SIGNIFICANT CHANGE UP
SPECIMEN SOURCE: SIGNIFICANT CHANGE UP

## 2021-04-10 ENCOUNTER — TRANSCRIPTION ENCOUNTER (OUTPATIENT)
Age: 81
End: 2021-04-10

## 2021-04-21 ENCOUNTER — EMERGENCY (EMERGENCY)
Facility: HOSPITAL | Age: 81
LOS: 1 days | Discharge: LEFT BEFORE DISCHARGE | End: 2021-04-21
Attending: EMERGENCY MEDICINE | Admitting: EMERGENCY MEDICINE
Payer: MEDICARE

## 2021-04-21 VITALS
WEIGHT: 164.91 LBS | RESPIRATION RATE: 16 BRPM | TEMPERATURE: 98 F | SYSTOLIC BLOOD PRESSURE: 120 MMHG | HEIGHT: 71 IN | HEART RATE: 77 BPM | DIASTOLIC BLOOD PRESSURE: 81 MMHG | OXYGEN SATURATION: 96 %

## 2021-04-21 VITALS
SYSTOLIC BLOOD PRESSURE: 118 MMHG | HEART RATE: 69 BPM | OXYGEN SATURATION: 96 % | DIASTOLIC BLOOD PRESSURE: 72 MMHG | RESPIRATION RATE: 16 BRPM

## 2021-04-21 LAB
ALBUMIN SERPL ELPH-MCNC: 3.6 G/DL — SIGNIFICANT CHANGE UP (ref 3.3–5)
ALP SERPL-CCNC: 69 U/L — SIGNIFICANT CHANGE UP (ref 40–120)
ALT FLD-CCNC: 7 U/L — LOW (ref 12–78)
ANION GAP SERPL CALC-SCNC: 7 MMOL/L — SIGNIFICANT CHANGE UP (ref 5–17)
AST SERPL-CCNC: 17 U/L — SIGNIFICANT CHANGE UP (ref 15–37)
BASOPHILS # BLD AUTO: 0.04 K/UL — SIGNIFICANT CHANGE UP (ref 0–0.2)
BASOPHILS NFR BLD AUTO: 0.7 % — SIGNIFICANT CHANGE UP (ref 0–2)
BILIRUB SERPL-MCNC: 0.8 MG/DL — SIGNIFICANT CHANGE UP (ref 0.2–1.2)
BUN SERPL-MCNC: 25 MG/DL — HIGH (ref 7–23)
CALCIUM SERPL-MCNC: 9 MG/DL — SIGNIFICANT CHANGE UP (ref 8.5–10.1)
CHLORIDE SERPL-SCNC: 110 MMOL/L — HIGH (ref 96–108)
CO2 SERPL-SCNC: 27 MMOL/L — SIGNIFICANT CHANGE UP (ref 22–31)
CREAT SERPL-MCNC: 0.94 MG/DL — SIGNIFICANT CHANGE UP (ref 0.5–1.3)
D DIMER BLD IA.RAPID-MCNC: 652 NG/ML DDU — HIGH
EOSINOPHIL # BLD AUTO: 0.19 K/UL — SIGNIFICANT CHANGE UP (ref 0–0.5)
EOSINOPHIL NFR BLD AUTO: 3.4 % — SIGNIFICANT CHANGE UP (ref 0–6)
GLUCOSE SERPL-MCNC: 109 MG/DL — HIGH (ref 70–99)
HCT VFR BLD CALC: 40.7 % — SIGNIFICANT CHANGE UP (ref 39–50)
HGB BLD-MCNC: 13.3 G/DL — SIGNIFICANT CHANGE UP (ref 13–17)
IMM GRANULOCYTES NFR BLD AUTO: 0.2 % — SIGNIFICANT CHANGE UP (ref 0–1.5)
LYMPHOCYTES # BLD AUTO: 1.62 K/UL — SIGNIFICANT CHANGE UP (ref 1–3.3)
LYMPHOCYTES # BLD AUTO: 29.2 % — SIGNIFICANT CHANGE UP (ref 13–44)
MCHC RBC-ENTMCNC: 30.4 PG — SIGNIFICANT CHANGE UP (ref 27–34)
MCHC RBC-ENTMCNC: 32.7 GM/DL — SIGNIFICANT CHANGE UP (ref 32–36)
MCV RBC AUTO: 93.1 FL — SIGNIFICANT CHANGE UP (ref 80–100)
MONOCYTES # BLD AUTO: 0.56 K/UL — SIGNIFICANT CHANGE UP (ref 0–0.9)
MONOCYTES NFR BLD AUTO: 10.1 % — SIGNIFICANT CHANGE UP (ref 2–14)
NEUTROPHILS # BLD AUTO: 3.13 K/UL — SIGNIFICANT CHANGE UP (ref 1.8–7.4)
NEUTROPHILS NFR BLD AUTO: 56.4 % — SIGNIFICANT CHANGE UP (ref 43–77)
NRBC # BLD: 0 /100 WBCS — SIGNIFICANT CHANGE UP (ref 0–0)
NT-PROBNP SERPL-SCNC: 102 PG/ML — SIGNIFICANT CHANGE UP (ref 0–450)
PLATELET # BLD AUTO: 221 K/UL — SIGNIFICANT CHANGE UP (ref 150–400)
POTASSIUM SERPL-MCNC: 3.8 MMOL/L — SIGNIFICANT CHANGE UP (ref 3.5–5.3)
POTASSIUM SERPL-SCNC: 3.8 MMOL/L — SIGNIFICANT CHANGE UP (ref 3.5–5.3)
PROT SERPL-MCNC: 7.2 G/DL — SIGNIFICANT CHANGE UP (ref 6–8.3)
RBC # BLD: 4.37 M/UL — SIGNIFICANT CHANGE UP (ref 4.2–5.8)
RBC # FLD: 12.9 % — SIGNIFICANT CHANGE UP (ref 10.3–14.5)
SARS-COV-2 RNA SPEC QL NAA+PROBE: SIGNIFICANT CHANGE UP
SODIUM SERPL-SCNC: 144 MMOL/L — SIGNIFICANT CHANGE UP (ref 135–145)
TROPONIN I SERPL-MCNC: <.015 NG/ML — SIGNIFICANT CHANGE UP (ref 0.01–0.04)
TROPONIN I SERPL-MCNC: <.015 NG/ML — SIGNIFICANT CHANGE UP (ref 0.01–0.04)
WBC # BLD: 5.55 K/UL — SIGNIFICANT CHANGE UP (ref 3.8–10.5)
WBC # FLD AUTO: 5.55 K/UL — SIGNIFICANT CHANGE UP (ref 3.8–10.5)

## 2021-04-21 PROCEDURE — 71045 X-RAY EXAM CHEST 1 VIEW: CPT

## 2021-04-21 PROCEDURE — 85379 FIBRIN DEGRADATION QUANT: CPT

## 2021-04-21 PROCEDURE — 83880 ASSAY OF NATRIURETIC PEPTIDE: CPT

## 2021-04-21 PROCEDURE — 85025 COMPLETE CBC W/AUTO DIFF WBC: CPT

## 2021-04-21 PROCEDURE — U0003: CPT

## 2021-04-21 PROCEDURE — 99284 EMERGENCY DEPT VISIT MOD MDM: CPT

## 2021-04-21 PROCEDURE — 93005 ELECTROCARDIOGRAM TRACING: CPT

## 2021-04-21 PROCEDURE — 71275 CT ANGIOGRAPHY CHEST: CPT

## 2021-04-21 PROCEDURE — U0005: CPT

## 2021-04-21 PROCEDURE — 93010 ELECTROCARDIOGRAM REPORT: CPT

## 2021-04-21 PROCEDURE — 71045 X-RAY EXAM CHEST 1 VIEW: CPT | Mod: 26

## 2021-04-21 PROCEDURE — 36415 COLL VENOUS BLD VENIPUNCTURE: CPT

## 2021-04-21 PROCEDURE — 99284 EMERGENCY DEPT VISIT MOD MDM: CPT | Mod: 25

## 2021-04-21 PROCEDURE — 84484 ASSAY OF TROPONIN QUANT: CPT

## 2021-04-21 PROCEDURE — 71275 CT ANGIOGRAPHY CHEST: CPT | Mod: 26,MA

## 2021-04-21 PROCEDURE — 99285 EMERGENCY DEPT VISIT HI MDM: CPT | Mod: CS

## 2021-04-21 PROCEDURE — 80053 COMPREHEN METABOLIC PANEL: CPT

## 2021-04-21 RX ORDER — ASPIRIN/CALCIUM CARB/MAGNESIUM 324 MG
324 TABLET ORAL ONCE
Refills: 0 | Status: COMPLETED | OUTPATIENT
Start: 2021-04-21 | End: 2021-04-21

## 2021-04-21 RX ADMIN — Medication 324 MILLIGRAM(S): at 05:12

## 2021-04-21 NOTE — ED ADULT TRIAGE NOTE - CHIEF COMPLAINT QUOTE
patient states" I woke up with heaviness in chest since 1230am- with trouble breathing"- denies any radiation

## 2021-04-21 NOTE — ED PROVIDER NOTE - PATIENT PORTAL LINK FT
You can access the FollowMyHealth Patient Portal offered by [facility] by registering at the following website: [email or web address]. By joining [facility]’s FollowMyHealth portal, you will also be able to view your health information using other applications (apps) compatible with our system.

## 2021-04-21 NOTE — ED ADULT NURSE NOTE - NSIMPLEMENTINTERV_GEN_ALL_ED
Implemented All Fall with Harm Risk Interventions:  [place] to call system. Call bell, personal items and telephone within reach. Instruct patient to call for assistance. Room bathroom lighting operational. Non-slip footwear when patient is off stretcher. Physically safe environment: no spills, clutter or unnecessary equipment. Stretcher in lowest position, wheels locked, appropriate side rails in place. Provide visual cue, wrist band, yellow gown, etc. Monitor gait and stability. Monitor for mental status changes and reorient to person, place, and time. Review medications for side effects contributing to fall risk. Reinforce activity limits and safety measures with patient and family. Provide visual clues: red socks.

## 2021-04-21 NOTE — ED PROVIDER NOTE - SIGNIFICANT NEGATIVE FINDINGS
no headache, no radiation, no diaphoresis, , no Syncope , no palpitations, no n/v,  no neuro changes.

## 2021-04-21 NOTE — ED PROVIDER NOTE - CARE PROVIDER_API CALL
[facility])  Internal Medicine  [address], [place] [id number]  Phone: [phone]  Fax: [phone]  Follow Up Time:

## 2021-04-21 NOTE — PHARMACOTHERAPY INTERVENTION NOTE - COMMENTS
Pharmacy Discharge Counseling Note    Patient is a 81y years old Male came in for chest discomfort and shortness of breath  Patient Pharmacy: [facility]    Medications were reviewed and following were discussed:    Patient leaving AMA - spoke with patient and his wife to see if he had any questions about his medications. Patient stated he had no questions.     Patient verbalized understanding

## 2021-04-21 NOTE — ED PROVIDER NOTE - PROGRESS NOTE DETAILS
Wants to leave AMA. Does not want to wait any longer for cardiology and wants to leave AMA. Acepts responsibility of leaving including death.

## 2021-04-21 NOTE — CONSULT NOTE ADULT - ATTENDING COMMENTS
ED HPI GENERAL MEDICAL PROBLEM





- General


Chief Complaint: Behavioral/Psych


Stated Complaint: UNKNOWN


Time Seen by Provider: 06/06/19 09:40


Source of Information: Reports: Patient


History Limitations: Reports: No Limitations





- History of Present Illness


INITIAL COMMENTS - FREE TEXT/NARRATIVE: 


History of present illness:


[][facility] with a 

new provider that he stated he was feeling more isolated. He denied suicidal or 

homicidal ideation but was sent here for a "mental health evaluation". Patient 

states he started a new job 2 weeks ago that is a new type of job that he feels 

he is still in the learning curve and he switch schedules to working nights. He 

has medical appointments but is having a difficult time scheduling them due to 

his sleep schedule. Patient has not had any medication changes but has altered 

the times he has taken his meds due to his new work schedule. Patient states he 

feels good that the [facility] hasn't spent a good amount of time talking with him and 

referred him to the ER. He is comfortable going home and he reiterates that he 

is not suicidal or homicidal at this time. I also spoke to his wife who is at 

the bedside she states that this feeling he has his ongoing and stable and she 

does not see any concern that his depression is escalating.





Review of systems: 


As per history of present illness and below otherwise all systems reviewed and 

negative.





Past medical history: 


As per history of present illness and as reviewed below otherwise 

noncontributory.





Surgical history: 


As per history of present illness and as reviewed below otherwise 

noncontributory.





Social history: 


No reported history of drug or alcohol abuse.





Family history: 


As per history of present illness and as reviewed below otherwise 

noncontributory.





Physical exam:


General: Well developed, well nourished in NAD


HEENT: Atraumatic, normocephalic, pupils reactive, negative for conjunctival 

pallor or scleral icterus, mucous membranes moist, throat clear, neck supple, 

nontender, trachea midline.


Lungs: Clear to auscultation, breath sounds equal bilaterally, chest nontender.


Heart: S1S2, regular, negative for clicks, rubs, or JVD.


Abdomen: NABS, Soft, nondistended, nontender. Negative for masses or 

hepatosplenomegaly. Negative for costovertebral tenderness.


Pelvis: Stable nontender.


Genitourinary: Deferred.


Rectal: Deferred.


Extremities: Atraumatic, negative for cords or calf pain. Neurovascular 

unremarkable.


Neuro: Awake, alert, oriented. Cranial nerves II through XII unremarkable. 

Cerebellum unremarkable. Motor and sensory unremarkable throughout. Exam 

nonfocal.


Skin:warm and dry


Psych: Patient is alert, oriented,





Diagnostics:


None





Therapeutics:


None





ED Course:


Stable





Impression: 


Medical Screening exam





Prescriptions:


None





Plan:


Resources for [facility] services and [facility] hotline given to patient.





Definitive disposition and diagnosis as appropriate pending reevaluation and 

review of above.








- Related Data


 Allergies











Allergy/AdvReac Type Severity Reaction Status Date / Time


 


No Known Allergies Allergy   Verified 06/06/19 09:53














ED ROS GENERAL





- Review of Systems


Review Of Systems: ROS reveals no pertinent complaints other than HPI.





ED EXAM, BEHAVIORAL HEALTH





- Physical Exam


Exam: See Below





COURSE, BEHAVIORAL HEALTH COMP





- Course


Vital Signs: 


 Last Vital Signs











Temp  97.2 F   06/06/19 09:49


 


Pulse  67   06/06/19 09:49


 


Resp  16   06/06/19 09:49


 


BP  124/84   06/06/19 09:49


 


Pulse Ox  98   06/06/19 09:49














Departure





- Departure


Time of Disposition: 10:18


Disposition: Home, Self-Care 01


Condition: Good


Clinical Impression: 


 Encounter for medical screening examination








- Discharge Information


*PRESCRIPTION DRUG MONITORING PROGRAM REVIEWED*: No


*COPY OF PRESCRIPTION DRUG MONITORING REPORT IN PATIENT JASMYNE: No


Instructions:  Medical Screening Exam


Referrals: 


PCP,None [Primary Care Provider] - 


Forms:  ED Department Discharge


Additional Instructions: 


The following information is given to patients seen in the emergency department 

who are being discharged to home. This information is to outline your options 

for follow-up care. We provide all patients seen in our emergency department 

with a follow-up referral.





The need for follow-up, as well as the timing and circumstances, are variable 

depending upon the specifics of your emergency department visit.





If you don't have a primary care physician on staff, we will provide you with a 

referral. We always advise you to contact your personal physician following an 

emergency department visit to inform them of the circumstance of the visit and 

for follow-up with them and/or the need for any referrals to a consulting 

specialist.





The emergency department will also refer you to a specialist when appropriate. 

This referral assures that you have the opportunity for follow-up care with a 

specialist. All of these measure are taken in an effort to provide you with 

optimal care, which includes your follow-up.





Under all circumstances we always encourage you to contact your private 

physician who remains a resource for coordinating your care. When calling for 

follow-up care, please make the office aware that this follow-up is from your 

recent emergency room visit. If for any reason you are refused follow-up, 

please contact the [facility] Emergency 

Department at [phone] and asked to speak to the emergency department 

charge nurse.





Follow-up with primary care, [facility] if needed, and return if 

any suicidal thoughts occur. I was physically present for the key portions of the evaluation and management (E/M) service provided.  I agree with the above history, physical, and plan, which I have reviewed and edited where appropriate.    The patient has symptoms of atypical chest pain. The patient's chest pain is unlikely related to an acute coronary syndrome. The cardiac enzyme profile supports this. The EKG does not have any ischemic changes. No signs of volume overload.  At this point, the patient can be discharged from a cardiac standpoint and will followup as an outpt for further cardiac testing.

## 2021-04-21 NOTE — ED ADULT NURSE REASSESSMENT NOTE - NS ED NURSE REASSESS COMMENT FT1
Warm blanket given to patient.
Received pt in bed alert and oriented x4.  VS stable.  Awaiting cardio.  No concerns at this time.  Trop to be repeated at 11 am.

## 2021-04-21 NOTE — ED PROVIDER NOTE - OBJECTIVE STATEMENT
82 y/o male h/o parkinsonism C/C patient states" I woke up with heaviness in chest and SOB at 1230am- no diaphoresis,  radiation, no nausea no vomiting, no fever, no chills, no COVID symptoms, no stroke symptoms. By the time he came to the ED his symptoms resolved , he has a remote h/o smoking. Also received the COVID vaccine. 82 y/o male h/o parkinsonism, renal stones  C/C patient states" I woke up with heaviness in chest and SOB at 1230am- no diaphoresis,  radiation, no nausea no vomiting, no fever, no chills, no COVID symptoms, no stroke symptoms. By the time he came to the ED his symptoms resolved , he has a remote h/o smoking. Also received the COVID vaccine.

## 2021-04-21 NOTE — CONSULT NOTE ADULT - SUBJECTIVE AND OBJECTIVE BOX
Patient is a 81y old  Male who presents with a chief complaint of     HPI: 82 yo male PMH Parkinson's disease, CAD s/p cardiac cath with stent 2001, renal caliculi presents to ED after sudden onset chest heaviness around 1 am this morning. Patient states he was asleep and got up to use the restroom, while walking  back from using the restroom patient felt sudden onset chest heaviness, trouble breathing, palpitations, and lightheadedness. Patient denies true chest pain but states his chest was heavy where he felt he could not take a deep breath. Symptoms persisted until patient got to ED and was given 325mg ASA. Patient currently feeling well, at baseline. Denies any CP, palpitations, SOB, lightheadedness, dizziness. Patient regularly follows with Dr. Simpson, has seen her within the past year. Patient states he last got a stress test and TTE a few years ago, told they were normal but is unable to do repeat testing 2/2 his progressive Parkinson's Disease.      PAST MEDICAL & SURGICAL HISTORY:  CAD (Coronary Artery Disease)  2001, s/p cardiac cath with stent 2001  Parkinson Disease  2009  Hyperlipidemia  2006  Renal Calculi  1984  Inguinal hernia  right  Constipation  CAD (coronary artery disease)  S/P Cardiac Cath  2001 with stent, 2002  S/P Lumbar Discectomy  1995  S/P Cystoscopy  X3, ESWL 1984              ECHO  FINDINGS: NA      MEDICATIONS  (STANDING):    MEDICATIONS  (PRN):      FAMILY HISTORY:  Denies Family history of CAD or early MI      Constitutional: denies fever, chills  HEENT: denies blurry vision, difficulty hearing  Respiratory: denies SOB, SOLIMAN, cough  Cardiovascular: denies CP, palpitations, orthopnea, LE edema  Gastrointestinal: denies nausea, vomiting, abdominal pain  Genitourinary: denies urinary changes  Skin: Denies rashes, itching  Neurologic: +Parkinson's disease, denies headache, weakness, dizziness  Hematology/Oncology: denies bleeding, easy bruising        SOCIAL HISTORY:  No tobacco, Alcohol or Drug use    Vital Signs Last 24 Hrs  T(C): 36.5 (21 Apr 2021 04:51), Max: 36.5 (21 Apr 2021 04:51)  T(F): 97.7 (21 Apr 2021 04:51), Max: 97.7 (21 Apr 2021 04:51)  HR: 69 (21 Apr 2021 08:16) (69 - 77)  BP: 118/72 (21 Apr 2021 08:16) (118/72 - 120/81)  BP(mean): --  RR: 16 (21 Apr 2021 08:16) (16 - 16)  SpO2: 96% (21 Apr 2021 08:16) (96% - 96%)    Physical Exam:  General: Well developed, well nourished, NAD  HEENT: NCAT, PERRLA, EOMI bl, moist mucous membranes   Neck: Supple, nontender  Neurology: A&Ox3, answering questions appropriately   Respiratory: CTA B/L, No W/R/R  CV: distant heart sounds, RRR, +S1/S2, no murmurs  Abdominal: Soft, NT, ND  Extremities: No C/C/E, + peripheral pulses  MSK: Normal ROM, no joint erythema or warmth, no joint swelling   Heme: No obvious ecchymosis or petechiae   Skin: warm, dry, normal color      ECG: NSR rate 74 with 1st degree AV block    I&O's Detail      LABS:                        13.3   5.55  )-----------( 221      ( 21 Apr 2021 05:12 )             40.7     04-21    144  |  110<H>  |  25<H>  ----------------------------<  109<H>  3.8   |  27  |  0.94    Ca    9.0      21 Apr 2021 05:12    TPro  7.2  /  Alb  3.6  /  TBili  0.8  /  DBili  x   /  AST  17  /  ALT  7<L>  /  AlkPhos  69  04-21    CARDIAC MARKERS ( 21 Apr 2021 05:12 )  <.015 ng/mL / x     / x     / x     / x              I&O's Summary    BNPSerum Pro-Brain Natriuretic Peptide: 102 pg/mL (04-21 @ 05:12)    RADIOLOGY & ADDITIONAL STUDIES:  < from: CT Angio Chest w/ IV Cont (04.21.21 @ 06:34) >  EXAM:  CT ANGIO CHEST (W)AW IC                            PROCEDURE DATE:  04/21/2021          INTERPRETATION:  CLINICAL INDICATION:    TECHNIQUE:    Spiral axial tomographic images were performed from the apex of the lungs to the domes of the diaphragm during the dynamic injection of intravenous contrast, as per pulmonary embolism protocol. MIP images were also obtained.    CONTRAST/COMPLICATIONS:  IV Contrast: Omnipaque 350  90 cc administered   10 cc discarded  Oral Contrast: NONE  Complications: None reported at time of study completion      FINDINGS:    Soft tissue windows:    The pulmonary artery branches are followed to the segmental divisions and appear patent without evidence of thrombi or filling defects.    The thyroid gland is unremarkable. There are no intrathoracic lymph nodes which meet CT criteria for enlargement. There is no significant cardiac chamber enlargement. The aorta and pulmonary artery are normal in size. There are coronary artery calcifications. There is no pleural or pericardial effusion.    The visualized portions of the upper abdomen demonstrates a partially visualized left-sided hydroureteronephrosis with multiple bilateral intrarenal calculi. Right renal cyst noted.      Lung windows:    Subsegmentalatelectasis at the lung bases. There is no focal consolidation, parenchymal mass or suspicious pulmonary nodule. The central bronchi are patent.      Bone windows: There is multilevel thoracic spondylosis. Prominent Schmorl's node noted along the superior endplate of the L3 vertebral body with associated mild compression deformity.        IMPRESSION:    No evidence of pulmonary embolism followed to the segmental pulmonary arterial branches.    Partially visualized left-sided hydroureteronephrosiswith multiple bilateral intrarenal calculi. A dedicated CT of the abdomen and pelvis can be obtained to evaluate for interval change from the prior study dated 3/28/2021.            CLAUDIA CONNOLLY MD; Attending Radiologist  This document has been electronically signed. Apr 21 2021  6:50AM    < end of copied text >   Patient is a 81y old  Male who presents with a chief complaint of     HPI: 82 yo male PMH Parkinson's disease, CAD s/p cardiac cath with stent 2001, renal caliculi presents to ED after sudden onset chest heaviness around 1 am this morning. Patient states he was asleep and got up to use the restroom, while walking  back from using the restroom patient felt sudden onset chest heaviness, trouble breathing, palpitations, and lightheadedness. Patient denies true chest pain but states his chest was heavy where he felt he could not take a deep breath. Symptoms improved slightly with sitting up, exacerbated with laying down. Symptoms persisted for about 3-4 hours until he got to ED and was given 325mg ASA. Patient currently feeling well, at baseline. Denies any CP, palpitations, SOB, lightheadedness, dizziness. Patient regularly follows with Dr. Simpson, has seen her within the past year. Patient states he last got a stress test and TTE a few years ago, told they were normal but is unable to do repeat testing 2/2 his progressive Parkinson's Disease.      PAST MEDICAL & SURGICAL HISTORY:  CAD (Coronary Artery Disease)  2001, s/p cardiac cath with stent 2001  Parkinson Disease  2009  Hyperlipidemia  2006  Renal Calculi  1984  Inguinal hernia  right  Constipation  CAD (coronary artery disease)  S/P Cardiac Cath  2001 with stent, 2002  S/P Lumbar Discectomy  1995  S/P Cystoscopy  X3, ESWL 1984              ECHO  FINDINGS: NA      MEDICATIONS  (STANDING):    MEDICATIONS  (PRN):      FAMILY HISTORY:  Denies Family history of CAD or early MI      Constitutional: denies fever, chills  HEENT: denies blurry vision, difficulty hearing  Respiratory: denies SOB, SOLIMAN, cough  Cardiovascular: denies CP, palpitations, orthopnea, LE edema  Gastrointestinal: denies nausea, vomiting, abdominal pain  Genitourinary: denies urinary changes  Skin: Denies rashes, itching  Neurologic: +Parkinson's disease, denies headache, weakness, dizziness  Hematology/Oncology: denies bleeding, easy bruising        SOCIAL HISTORY:  No tobacco, Alcohol or Drug use    Vital Signs Last 24 Hrs  T(C): 36.5 (21 Apr 2021 04:51), Max: 36.5 (21 Apr 2021 04:51)  T(F): 97.7 (21 Apr 2021 04:51), Max: 97.7 (21 Apr 2021 04:51)  HR: 69 (21 Apr 2021 08:16) (69 - 77)  BP: 118/72 (21 Apr 2021 08:16) (118/72 - 120/81)  BP(mean): --  RR: 16 (21 Apr 2021 08:16) (16 - 16)  SpO2: 96% (21 Apr 2021 08:16) (96% - 96%)    Physical Exam:  General: Well developed, well nourished, NAD  HEENT: NCAT, PERRLA, EOMI bl, moist mucous membranes   Neck: Supple, nontender  Neurology: A&Ox3, answering questions appropriately   Respiratory: CTA B/L, No W/R/R  CV: distant heart sounds, RRR, +S1/S2, no murmurs  Abdominal: Soft, NT, ND  Extremities: No C/C/E, + peripheral pulses  MSK: Normal ROM, no joint erythema or warmth, no joint swelling   Heme: No obvious ecchymosis or petechiae   Skin: warm, dry, normal color      ECG: NSR rate 74 with 1st degree AV block    I&O's Detail      LABS:                        13.3   5.55  )-----------( 221      ( 21 Apr 2021 05:12 )             40.7     04-21    144  |  110<H>  |  25<H>  ----------------------------<  109<H>  3.8   |  27  |  0.94    Ca    9.0      21 Apr 2021 05:12    TPro  7.2  /  Alb  3.6  /  TBili  0.8  /  DBili  x   /  AST  17  /  ALT  7<L>  /  AlkPhos  69  04-21    CARDIAC MARKERS ( 21 Apr 2021 05:12 )  <.015 ng/mL / x     / x     / x     / x              I&O's Summary    BNPSerum Pro-Brain Natriuretic Peptide: 102 pg/mL (04-21 @ 05:12)    RADIOLOGY & ADDITIONAL STUDIES:  < from: CT Angio Chest w/ IV Cont (04.21.21 @ 06:34) >  EXAM:  CT ANGIO CHEST (W)AW IC                            PROCEDURE DATE:  04/21/2021          INTERPRETATION:  CLINICAL INDICATION:    TECHNIQUE:    Spiral axial tomographic images were performed from the apex of the lungs to the domes of the diaphragm during the dynamic injection of intravenous contrast, as per pulmonary embolism protocol. MIP images were also obtained.    CONTRAST/COMPLICATIONS:  IV Contrast: Omnipaque 350  90 cc administered   10 cc discarded  Oral Contrast: NONE  Complications: None reported at time of study completion      FINDINGS:    Soft tissue windows:    The pulmonary artery branches are followed to the segmental divisions and appear patent without evidence of thrombi or filling defects.    The thyroid gland is unremarkable. There are no intrathoracic lymph nodes which meet CT criteria for enlargement. There is no significant cardiac chamber enlargement. The aorta and pulmonary artery are normal in size. There are coronary artery calcifications. There is no pleural or pericardial effusion.    The visualized portions of the upper abdomen demonstrates a partially visualized left-sided hydroureteronephrosis with multiple bilateral intrarenal calculi. Right renal cyst noted.      Lung windows:    Subsegmentalatelectasis at the lung bases. There is no focal consolidation, parenchymal mass or suspicious pulmonary nodule. The central bronchi are patent.      Bone windows: There is multilevel thoracic spondylosis. Prominent Schmorl's node noted along the superior endplate of the L3 vertebral body with associated mild compression deformity.        IMPRESSION:    No evidence of pulmonary embolism followed to the segmental pulmonary arterial branches.    Partially visualized left-sided hydroureteronephrosiswith multiple bilateral intrarenal calculi. A dedicated CT of the abdomen and pelvis can be obtained to evaluate for interval change from the prior study dated 3/28/2021.            CLAUDIA CONNOLLY MD; Attending Radiologist  This document has been electronically signed. Apr 21 2021  6:50AM    < end of copied text >   Patient is a 81y old  Male who presents with a chief complaint of     HPI: 80 yo male PMH Parkinson's disease, CAD s/p cardiac cath with stent 2001, renal caliculi presents to ED after sudden onset chest heaviness around 1 am this morning. Patient states he was asleep and got up to use the restroom, while walking  back from using the restroom patient felt sudden onset chest heaviness, trouble breathing, palpitations, and lightheadedness. Patient denies true chest pain but states his chest was heavy where he felt he could not take a deep breath. Symptoms improved slightly with sitting up, exacerbated with laying down. Symptoms persisted for about 3-4 hours until he got to ED and was given 325mg ASA. Patient currently feeling well, at baseline. Denies any CP, palpitations, SOB, lightheadedness, dizziness. Patient regularly follows with Dr. Simpson, has seen her within the past year. Patient states he last got a stress test and TTE a few years ago, told they were normal but is unable to do repeat testing 2/2 his progressive Parkinson's Disease.      PAST MEDICAL & SURGICAL HISTORY:  CAD (Coronary Artery Disease)  2001, s/p cardiac cath with stent 2001  Parkinson Disease  2009  Hyperlipidemia  2006  Renal Calculi  1984  Inguinal hernia  right  Constipation  CAD (coronary artery disease)  S/P Cardiac Cath  2001 with stent, 2002  S/P Lumbar Discectomy  1995  S/P Cystoscopy  X3, ESWL 1984    ECHO  FINDINGS: NA    FAMILY HISTORY:  Denies Family history of CAD or early MI      Constitutional: denies fever, chills  HEENT: denies blurry vision, difficulty hearing  Respiratory: denies SOB, SOLIMAN, cough  Cardiovascular: denies CP, palpitations, orthopnea, LE edema  Gastrointestinal: denies nausea, vomiting, abdominal pain  Genitourinary: denies urinary changes  Skin: Denies rashes, itching  Neurologic: +Parkinson's disease, denies headache, weakness, dizziness  Hematology/Oncology: denies bleeding, easy bruising        SOCIAL HISTORY:  No tobacco, Alcohol or Drug use    Vital Signs Last 24 Hrs  T(C): 36.5 (21 Apr 2021 04:51), Max: 36.5 (21 Apr 2021 04:51)  T(F): 97.7 (21 Apr 2021 04:51), Max: 97.7 (21 Apr 2021 04:51)  HR: 69 (21 Apr 2021 08:16) (69 - 77)  BP: 118/72 (21 Apr 2021 08:16) (118/72 - 120/81)  BP(mean): --  RR: 16 (21 Apr 2021 08:16) (16 - 16)  SpO2: 96% (21 Apr 2021 08:16) (96% - 96%)    Physical Exam:  General: Well developed, well nourished, NAD  HEENT: NCAT, PERRLA, EOMI bl, moist mucous membranes   Neck: Supple, nontender  Neurology: A&Ox3, answering questions appropriately   Respiratory: CTA B/L, No W/R/R  CV: distant heart sounds, RRR, +S1/S2, no murmurs  Abdominal: Soft, NT, ND  Extremities: No C/C/E, + peripheral pulses  MSK: Normal ROM, no joint erythema or warmth, no joint swelling   Heme: No obvious ecchymosis or petechiae   Skin: warm, dry, normal color      ECG: NSR rate 74 with 1st degree AV block    I&O's Detail      LABS:                        13.3   5.55  )-----------( 221      ( 21 Apr 2021 05:12 )             40.7     04-21    144  |  110<H>  |  25<H>  ----------------------------<  109<H>  3.8   |  27  |  0.94    Ca    9.0      21 Apr 2021 05:12    TPro  7.2  /  Alb  3.6  /  TBili  0.8  /  DBili  x   /  AST  17  /  ALT  7<L>  /  AlkPhos  69  04-21    CARDIAC MARKERS ( 21 Apr 2021 05:12 )  <.015 ng/mL / x     / x     / x     / x              I&O's Summary    BNPSerum Pro-Brain Natriuretic Peptide: 102 pg/mL (04-21 @ 05:12)    RADIOLOGY & ADDITIONAL STUDIES:  < from: CT Angio Chest w/ IV Cont (04.21.21 @ 06:34) >  EXAM:  CT ANGIO CHEST (W)AW IC                            PROCEDURE DATE:  04/21/2021          INTERPRETATION:  CLINICAL INDICATION:    TECHNIQUE:    Spiral axial tomographic images were performed from the apex of the lungs to the domes of the diaphragm during the dynamic injection of intravenous contrast, as per pulmonary embolism protocol. MIP images were also obtained.    CONTRAST/COMPLICATIONS:  IV Contrast: Omnipaque 350  90 cc administered   10 cc discarded  Oral Contrast: NONE  Complications: None reported at time of study completion      FINDINGS:    Soft tissue windows:    The pulmonary artery branches are followed to the segmental divisions and appear patent without evidence of thrombi or filling defects.    The thyroid gland is unremarkable. There are no intrathoracic lymph nodes which meet CT criteria for enlargement. There is no significant cardiac chamber enlargement. The aorta and pulmonary artery are normal in size. There are coronary artery calcifications. There is no pleural or pericardial effusion.    The visualized portions of the upper abdomen demonstrates a partially visualized left-sided hydroureteronephrosis with multiple bilateral intrarenal calculi. Right renal cyst noted.      Lung windows:    Subsegmentalatelectasis at the lung bases. There is no focal consolidation, parenchymal mass or suspicious pulmonary nodule. The central bronchi are patent.      Bone windows: There is multilevel thoracic spondylosis. Prominent Schmorl's node noted along the superior endplate of the L3 vertebral body with associated mild compression deformity.        IMPRESSION:    No evidence of pulmonary embolism followed to the segmental pulmonary arterial branches.    Partially visualized left-sided hydroureteronephrosiswith multiple bilateral intrarenal calculi. A dedicated CT of the abdomen and pelvis can be obtained to evaluate for interval change from the prior study dated 3/28/2021.            CLAUDIA CONNOLLY MD; Attending Radiologist  This document has been electronically signed. Apr 21 2021  6:50AM    < end of copied text >

## 2021-04-21 NOTE — ED ADULT NURSE NOTE - OBJECTIVE STATEMENT
Patient with history of   presents via wheelchair from triage with c/o chest discomfort and SOB. Patient with history of cardiac stents, kidney stone and CAD, on plavix and ASA 81mg presents via wheelchair from triage with c/o chest discomfort and SOB that woke him from his sleep at 3am this morning.  Patient states SOB worse with lying down and better sitting up.  No numbness or tingling.  Received both doses of COVID vaccination.

## 2021-04-21 NOTE — CONSULT NOTE ADULT - ASSESSMENT
80 yo male PMH Parkinson's disease, CAD s/p cardiac cath with stent 2001, renal caliculi presents to ED after sudden onset chest heaviness, palpitations, lightheadedness, SOB around 1 am this morning.    -EKG showing NSR, rate 74, 1st degree AV block  -Troponin neg x1, trend 2nd set  -CTA negative for PE  -Episode of palpitations, lightheadedness, chest heaviness occurred after using restroom, likely 2/2 micturition syncope exacerbated with autonomic dysfunction due to Parkinson's Disease  -Patient currently hemodynamically stable, /72, HR 69  -If second set of cardiac enzymes negative, patient optimized for discharge with close outpatient follow up with cardiologist Dr. Simpson  80 yo male PMH Parkinson's disease, CAD s/p cardiac cath with stent 2001, renal caliculi presents to ED after sudden onset chest heaviness, palpitations, lightheadedness, SOB around 1 am this morning.    -EKG showing NSR, rate 74, 1st degree AV block  -CTA negative for PE  -Troponin neg x1, trend 2nd set  -Description of symptoms occurring after using restroom consistent with micturition syncope exacerbated with autonomic dysfunction due to Parkinson's Disease, although acute coronary syndrome needs to be ruled out with second set of CE given duration of symptoms (3-4 hours)  -Patient currently hemodynamically stable, /72, HR 69  -If second set of cardiac enzymes negative, patient optimized for discharge with close outpatient follow up with cardiologist Dr. Simpson for repeat stress testing and TTE  82 yo male PMH Parkinson's disease, CAD s/p cardiac cath with stent 2001, renal caliculi presents to ED after sudden onset chest heaviness, palpitations, lightheadedness, SOB around 1 am this morning.    -EKG showing NSR, rate 74, 1st degree AV block  -CTA negative for PE  -Troponin neg x1, trend 2nd set  -Description of symptoms occurring after using restroom consistent with micturition vasovagal episode exacerbated with autonomic dysfunction due to Parkinson's Disease, although acute coronary syndrome needs to be ruled out with second set of CE given duration of symptoms (3-4 hours)  -Patient currently hemodynamically stable, /72, HR 69  -If second set of cardiac enzymes negative, patient optimized for discharge with close outpatient follow up with cardiologist Dr. Simpson for repeat stress testing and TTE

## 2021-04-29 NOTE — H&P PST ADULT - AS BP NONINV METHOD
OBSTETRIC HISTORY AND PHYSICAL EXAM    PRIMARY ATTENDING/CERTIFIED NURSE MIDWIFE:  Gloria    ADMITTING ATTENDING/CERTIFIED NURSE MIDWIFE:  Daniella Bansal MD    HISTORY OF PRESENT ACTIVITY:  Coolidge Gottron is a 34year old [id number], INES (estimated due date) 9/15/2019 @ 34w6d who presents with  contractions. Reports: contractions since last evening. History of  labor. . Fetal movement present. Patient Active Problem List   Diagnosis   â¢ Migraine   â¢ Back pain   â¢ Bilateral hip pain   â¢ Left thyroid nodule   â¢ LOPEZ positive   â¢ Left-sided weakness   â¢ Other forms of systemic lupus erythematosus (CMS/HCC)   â¢ Scheuermann's kyphosis   â¢ Personal history of kidney stones   â¢ Leakage of amniotic fluid   â¢  (normal spontaneous vaginal delivery)   â¢ History of shoulder dystocia in prior pregnancy   â¢ History of  delivery, currently pregnant   â¢ Supervision of normal intrauterine pregnancy in multigravida in third trimester     OB History    Para Term  AB Living   7 1   1 5 1   SAB TAB Ectopic Molar Multiple Live Births   4       0 1      # Outcome Date GA Lbr Ronnell/2nd Weight Sex Delivery Anes PTL Lv   7 Current            6  10/04/18 35w6d / 00:25 3230 g M Vag-Spont EPI N LORENZO      Complications: Shoulder Dystocia   5 AB 2015 9w0d    Miscar      4 SAB 2015 6w0d    Miscar      3 SAB 10/2014 6w0d    Miscar      2 SAB  6w0d          1 SAB  6w0d    Miscar         Obstetric Comments   2015: fetal pole and heartbeat at 6 weeks. No heartbeat on ultrasound at 9 weeks- missed AB without bleeding or cramping. D&C- see EPIC for full note      Menarche in 4th grade; monthly x 5-6d Past contraceptive use: OCP (brief) and depo shot.      Current pregnancy: routine prenatal care  Past Medical History:   Diagnosis Date   â¢ Anemia    â¢ Anxiety    â¢ Chronic kidney disease     stones   â¢ Fracture     right hip after a fall from ladder   â¢ CLAUDIA (generalized anxiety disorder) 2017   â¢ Ganglion of right wrist 2014   â¢ Habitual aborter, currently pregnant    â¢ History of abdominal pain 8/10/2017    US 2011 IMPRESSION:  Normal ultrasound scan of the right upper quadrant. â¢ Insomnia 2014   â¢ Missed ab 2015   â¢ Positive pregnancy test 3/1/2018    Multiple SABs. Has had some work up but nothing lately. Has used progesterone suppositories in the past but lost the pregnancy anyway. Pap-normal 2015. â¢ Scheuermann disease    â¢ Urinary tract infection      Past Surgical History:   Procedure Laterality Date   â¢ Cystoscopy w/ ureteral stent placement  2012    left ureteral stone (passed prior to procedure)   â¢ Cystoscopy w/ ureteral stent removal  2012   â¢ D and c  2015   â¢ Esophagogastroduodenoscopy transoral flex w/bx single or mult  10/27/2011    EGD with Bx duodenum and GE junction   â¢ Ganglion cyst excision Right     wrist   â¢ Joint replacement Right     hip   â¢ Removal gallbladder  2011   â¢ [place] tooth extraction       GYNECOLOGIC HISTORY:  Sherie Treadwell has no history of STDs (sexually transmitted diseases), Denies all sexually transmitted disease's. Social History     Tobacco Use   â¢ Smoking status: Former Smoker     Last attempt to quit: 2014     Years since quittin.2   â¢ Smokeless tobacco: Never Used   Substance Use Topics   â¢ Alcohol use: No     Alcohol/week: 0.0 oz       Sexually Active:  Yes             Partners with: Male       Birth Control/Protection: None      Drug Use:    No              Review of patient's social economics indicates:   /serrver               Family History   Problem Relation Age of Onset   â¢ High blood pressure Mother    â¢ Psychiatric Mother         depression   â¢ Psychiatric Maternal Grandmother         depression   â¢ COPD Maternal Grandmother    â¢ Diabetes Maternal Grandfather    â¢ Heart disease Maternal Grandfather    â¢ Hearing Loss Maternal Grandfather    â¢ Dementia/Alzheimers Maternal Grandfather    â¢ Kidney disease Maternal Grandfather    â¢ Congestive Heart Failure Maternal Grandfather    â¢ Systemic Lupus Erythematosus Paternal Grandmother    â¢ Systemic Lupus Erythematosus Other      Outpatient Medications Marked as Taking for the 8/10/19 encounter [facility] Encounter)   Medication Sig Dispense Refill   â¢ Ferrous Sulfate (IRON) 325 (65 Fe) MG Tab Take 325 mg by mouth 2 times daily. â¢ aspirin 81 MG tablet Take 81 mg by mouth daily. â¢ [id number] (PRENA1 PO)      â¢ citalopram (CELEXA) 20 MG tablet Take 1 tablet by mouth daily. 30 tablet 1   â¢ acetaminophen (TYLENOL) 325 MG tablet Take 2 tablets by mouth every 4 hours as needed for Pain. 30 tablet 0     ALLERGIES:  Imitrex [sumatriptan base]; Horse   (animal); Morphine; and Dilaudid [hydromorphone]    REVIEW OF SYSTEMS:  A 14 point comprehensive review of systems was negative    OBJECTIVE:  PHYSICAL EXAM  Visit Vitals  /63   Pulse 92   Temp 98.7 Â°F (37.1 Â°C) (Oral)   LMP 12/09/2018       General: WD (Well developed), WN (well nourished), , female in NAD (no acute distress). Psychiatric: A&O (Alert and oriented) x 3, appropriate mood and affect. Skin: W/D (Warm/dry), intact, no rashes or lesions. Lungs: Respirations unlabored. Heart: Regular rate and rhythm. Abdomen: Gravid, soft, NT(non tender), no masses. Extremities: No edema. Fetal Surveillance: Fetal heart variability: moderate  Fetal Heart Rate accelerations: yes  Baseline FHR: 140 per minute  Uterine contractions: Rare at this time. [place]:  Uterine Size: size equals dates   Sterile:   Vaginal Exam:    Dilation: Inner os 1 cm    Effacement:   Thick      PRENATAL LABS:  Blood Type: O positive  Antibody: negative  HGB (g/dL)   Date Value   08/10/2019 10.0 (L)   /  HCT (%)   Date Value   08/10/2019 31.4 (L)     First Trimester/Quad Screen: negative  Group B Strep: NA  One hour Glucose Tolerance Test: Normal    ASSESSMENT:  34year old [id number] @ 34w6d IUP with uterine irritability  Patient Active Problem List   Diagnosis   â¢ Migraine   â¢ Back pain   â¢ Bilateral hip pain   â¢ Left thyroid nodule   â¢ LOPEZ positive   â¢ Left-sided weakness   â¢ Other forms of systemic lupus erythematosus (CMS/HCC)   â¢ Scheuermann's kyphosis   â¢ Personal history of kidney stones   â¢ Leakage of amniotic fluid   â¢  (normal spontaneous vaginal delivery)   â¢ History of shoulder dystocia in prior pregnancy   â¢ History of  delivery, currently pregnant   â¢ Supervision of normal intrauterine pregnancy in multigravida in third trimester     Group B Strep: NA  Fetal Heart Rate Category 1    PLAN:  Admitted as inpatient to L&D (Labor and Delivery). D/W Dr. Otoniel Mejia. Will send home on Nifedipine.     Amy Moore CNM Xerosis Aggressive Treatment: I recommended application of Cetaphil or CeraVe numerous times a day going to bed to all dry areas. I also prescribed a topical steroid for twice daily use. electronic

## 2021-07-24 ENCOUNTER — EMERGENCY (EMERGENCY)
Facility: HOSPITAL | Age: 81
LOS: 1 days | Discharge: HOME | End: 2021-07-24
Attending: EMERGENCY MEDICINE | Admitting: EMERGENCY MEDICINE
Payer: MEDICARE

## 2021-07-24 VITALS
DIASTOLIC BLOOD PRESSURE: 75 MMHG | HEART RATE: 95 BPM | HEIGHT: 71 IN | WEIGHT: 160.06 LBS | TEMPERATURE: 98 F | SYSTOLIC BLOOD PRESSURE: 118 MMHG | RESPIRATION RATE: 18 BRPM | OXYGEN SATURATION: 97 %

## 2021-07-24 VITALS
DIASTOLIC BLOOD PRESSURE: 70 MMHG | OXYGEN SATURATION: 98 % | TEMPERATURE: 98 F | HEART RATE: 90 BPM | SYSTOLIC BLOOD PRESSURE: 110 MMHG | RESPIRATION RATE: 16 BRPM

## 2021-07-24 PROCEDURE — 99284 EMERGENCY DEPT VISIT MOD MDM: CPT

## 2021-07-24 PROCEDURE — 51702 INSERT TEMP BLADDER CATH: CPT

## 2021-07-24 PROCEDURE — 99283 EMERGENCY DEPT VISIT LOW MDM: CPT | Mod: 25

## 2021-07-24 RX ORDER — TAMSULOSIN HYDROCHLORIDE 0.4 MG/1
1 CAPSULE ORAL
Qty: 30 | Refills: 0
Start: 2021-07-24 | End: 2021-08-22

## 2021-07-24 RX ORDER — MULTIVIT WITH MIN/MFOLATE/K2 340-15/3 G
400 POWDER (GRAM) ORAL
Qty: 0 | Refills: 0 | DISCHARGE

## 2021-07-24 RX ORDER — TAMSULOSIN HYDROCHLORIDE 0.4 MG/1
0.4 CAPSULE ORAL ONCE
Refills: 0 | Status: COMPLETED | OUTPATIENT
Start: 2021-07-24 | End: 2021-07-24

## 2021-07-24 RX ADMIN — TAMSULOSIN HYDROCHLORIDE 0.4 MILLIGRAM(S): 0.4 CAPSULE ORAL at 10:12

## 2021-07-24 NOTE — ED ADULT NURSE NOTE - NSIMPLEMENTINTERV_GEN_ALL_ED
Implemented All Fall Risk Interventions:  [place] to call system. Call bell, personal items and telephone within reach. Instruct patient to call for assistance. Room bathroom lighting operational. Non-slip footwear when patient is off stretcher. Physically safe environment: no spills, clutter or unnecessary equipment. Stretcher in lowest position, wheels locked, appropriate side rails in place. Provide visual cue, wrist band, yellow gown, etc. Monitor gait and stability. Monitor for mental status changes and reorient to person, place, and time. Review medications for side effects contributing to fall risk. Reinforce activity limits and safety measures with patient and family.

## 2021-07-24 NOTE — ED PROVIDER NOTE - CARE PROVIDER_API CALL
Uzair Perales)  Urology  [address], [address]  [place] [address]  Phone: [phone]  Fax: [phone]  Follow Up Time:

## 2021-07-24 NOTE — ED PROVIDER NOTE - CONSTITUTIONAL, MLM
Well appearing, elderly white male, awake, alert, oriented to person, place, time/situation and in no apparent distress. normal...

## 2021-07-24 NOTE — ED PROVIDER NOTE - TEMPLATE
Final Anesthesia Post-op Assessment    Patient: Asuncion Ulrich  Procedure(s) Performed: OPEN REDUCTION INTERNAL FIXATION RIGHT  HIP - RIGHT  Anesthesia type: General    Vital Last Value   Temperature 36.2 °C (97.2 °F) (08/12/18 1245)   Pulse 79 (08/12/18 1245)   Respiratory Rate 18 (08/12/18 1245)   Non-Invasive   Blood Pressure 153/68 (08/12/18 1245)   Arterial  Blood Pressure     Pulse Oximetry 99 % (08/12/18 1245)     Last 24 I/O:   Intake/Output Summary (Last 24 hours) at 08/12/18 1313  Last data filed at 08/12/18 1015   Gross per 24 hour   Intake              750 ml   Output              835 ml   Net              -85 ml       PATIENT LOCATION: PACU Phase 2 (in ICU)  POST-OP VITAL SIGNS: stable  LEVEL OF CONSCIOUSNESS: awake and alert (talking baseline MS)  RESPIRATORY STATUS: spontaneous ventilation, unassisted and room air  CARDIOVASCULAR: blood pressure returned to baseline and stable  HYDRATION: euvolemic    PAIN MANAGEMENT: adequately controlled  NAUSEA: None  AIRWAY PATENCY: patent  POST-OP ASSESSMENT: no complications, patient tolerated procedure well with no complications, no evidence of recall and sufficiently recovered from acute administration of anesthesia effects and able to participate in evaluation  COMPLICATIONS: none  Comments: Pt is more awake and conversing during the repeat hip xray. She is comfortable. No complaint at this time. Hemodynamics good. No PONV.  Called to daughter to report pt's status.   Questions answered.    Satisfactory condition.      HANDOFF:  Handoff to receiving nurse was performed and questions were answered      
 Symptoms

## 2021-07-24 NOTE — ED PROVIDER NOTE - PATIENT PORTAL LINK FT
You can access the FollowMyHealth Patient Portal offered by [facility] by registering at the following website: [email or web address]. By joining [employer]’s FollowMyHealth portal, you will also be able to view your health information using other applications (apps) compatible with our system.

## 2021-07-24 NOTE — ED PROVIDER NOTE - OBJECTIVE STATEMENT
80 yo white male with one day of hard time passing urine but no dysuria, hematuria, nausea, vomiting or diarrhea. No abdominal or back pains. States that he did have a similar problem a while ago and was given Flomax.

## 2021-07-24 NOTE — ED PROVIDER NOTE - NSFOLLOWUPINSTRUCTIONS_ED_ALL_ED_FT
Rest  Home with Guillermo and Leg Bag  Take FLOMAX 0.4 mg every evening at bedtime  Follow-up with your Urologist on Monday  Return here if needed

## 2021-07-24 NOTE — ED ADULT NURSE NOTE - OBJECTIVE STATEMENT
Pt received sitting on stretcher in NAD. Pt AOx3 C/o not being able to urinate since last night. Pt states that he has been dribbling and unable to start a stream,  Neuro WNL, Parkinson's. . PERRLA. Lungs CTA, RR even unlabored. Ab soft non tender, + bowel sounds x 4quads. Denies Nausea, Vomiting, Diarrhea. Skin warm, dry, color appropriate for age and race.  Bladder was scanned >200 ML

## 2021-10-08 ENCOUNTER — RX RENEWAL (OUTPATIENT)
Age: 81
End: 2021-10-08

## 2021-10-08 RX ORDER — SIMVASTATIN 40 MG/1
40 TABLET, FILM COATED ORAL DAILY
Qty: 90 | Refills: 3 | Status: ACTIVE | COMMUNITY
Start: 2019-11-04 | End: 1900-01-01

## 2021-12-10 ENCOUNTER — EMERGENCY (EMERGENCY)
Facility: HOSPITAL | Age: 81
LOS: 1 days | Discharge: HOME | End: 2021-12-10
Attending: EMERGENCY MEDICINE | Admitting: EMERGENCY MEDICINE
Payer: MEDICARE

## 2021-12-10 VITALS
RESPIRATION RATE: 18 BRPM | OXYGEN SATURATION: 95 % | HEART RATE: 92 BPM | DIASTOLIC BLOOD PRESSURE: 60 MMHG | WEIGHT: 143.96 LBS | TEMPERATURE: 98 F | HEIGHT: 71 IN | SYSTOLIC BLOOD PRESSURE: 102 MMHG

## 2021-12-10 VITALS
HEART RATE: 87 BPM | SYSTOLIC BLOOD PRESSURE: 108 MMHG | RESPIRATION RATE: 19 BRPM | DIASTOLIC BLOOD PRESSURE: 63 MMHG | OXYGEN SATURATION: 97 % | TEMPERATURE: 98 F

## 2021-12-10 LAB
ALBUMIN SERPL ELPH-MCNC: 3 G/DL — LOW (ref 3.3–5)
ALP SERPL-CCNC: 60 U/L — SIGNIFICANT CHANGE UP (ref 30–120)
ALT FLD-CCNC: 17 U/L DA — SIGNIFICANT CHANGE UP (ref 10–60)
ANION GAP SERPL CALC-SCNC: 4 MMOL/L — LOW (ref 5–17)
APTT BLD: 29.9 SEC — SIGNIFICANT CHANGE UP (ref 27.5–35.5)
AST SERPL-CCNC: 13 U/L — SIGNIFICANT CHANGE UP (ref 10–40)
BASOPHILS # BLD AUTO: 0.02 K/UL — SIGNIFICANT CHANGE UP (ref 0–0.2)
BASOPHILS NFR BLD AUTO: 0.3 % — SIGNIFICANT CHANGE UP (ref 0–2)
BILIRUB SERPL-MCNC: 0.3 MG/DL — SIGNIFICANT CHANGE UP (ref 0.2–1.2)
BUN SERPL-MCNC: 24 MG/DL — HIGH (ref 7–23)
CALCIUM SERPL-MCNC: 8.6 MG/DL — SIGNIFICANT CHANGE UP (ref 8.4–10.5)
CHLORIDE SERPL-SCNC: 105 MMOL/L — SIGNIFICANT CHANGE UP (ref 96–108)
CO2 SERPL-SCNC: 29 MMOL/L — SIGNIFICANT CHANGE UP (ref 22–31)
CREAT SERPL-MCNC: 1.01 MG/DL — SIGNIFICANT CHANGE UP (ref 0.5–1.3)
D DIMER BLD IA.RAPID-MCNC: 745 NG/ML DDU — HIGH
EOSINOPHIL # BLD AUTO: 0.04 K/UL — SIGNIFICANT CHANGE UP (ref 0–0.5)
EOSINOPHIL NFR BLD AUTO: 0.7 % — SIGNIFICANT CHANGE UP (ref 0–6)
GLUCOSE SERPL-MCNC: 118 MG/DL — HIGH (ref 70–99)
HCT VFR BLD CALC: 34.7 % — LOW (ref 39–50)
HGB BLD-MCNC: 11.2 G/DL — LOW (ref 13–17)
IMM GRANULOCYTES NFR BLD AUTO: 0.3 % — SIGNIFICANT CHANGE UP (ref 0–1.5)
INR BLD: 1.06 RATIO — SIGNIFICANT CHANGE UP (ref 0.88–1.16)
LIDOCAIN IGE QN: 95 U/L — SIGNIFICANT CHANGE UP (ref 73–393)
LYMPHOCYTES # BLD AUTO: 0.46 K/UL — LOW (ref 1–3.3)
LYMPHOCYTES # BLD AUTO: 8 % — LOW (ref 13–44)
MAGNESIUM SERPL-MCNC: 1.7 MG/DL — SIGNIFICANT CHANGE UP (ref 1.6–2.6)
MCHC RBC-ENTMCNC: 30.7 PG — SIGNIFICANT CHANGE UP (ref 27–34)
MCHC RBC-ENTMCNC: 32.3 GM/DL — SIGNIFICANT CHANGE UP (ref 32–36)
MCV RBC AUTO: 95.1 FL — SIGNIFICANT CHANGE UP (ref 80–100)
MONOCYTES # BLD AUTO: 0.54 K/UL — SIGNIFICANT CHANGE UP (ref 0–0.9)
MONOCYTES NFR BLD AUTO: 9.4 % — SIGNIFICANT CHANGE UP (ref 2–14)
NEUTROPHILS # BLD AUTO: 4.65 K/UL — SIGNIFICANT CHANGE UP (ref 1.8–7.4)
NEUTROPHILS NFR BLD AUTO: 81.3 % — HIGH (ref 43–77)
NRBC # BLD: 0 /100 WBCS — SIGNIFICANT CHANGE UP (ref 0–0)
NT-PROBNP SERPL-SCNC: 226 PG/ML — SIGNIFICANT CHANGE UP (ref 0–450)
PHOSPHATE SERPL-MCNC: 3.2 MG/DL — SIGNIFICANT CHANGE UP (ref 2.5–4.5)
PLATELET # BLD AUTO: 219 K/UL — SIGNIFICANT CHANGE UP (ref 150–400)
POTASSIUM SERPL-MCNC: 4.3 MMOL/L — SIGNIFICANT CHANGE UP (ref 3.5–5.3)
POTASSIUM SERPL-SCNC: 4.3 MMOL/L — SIGNIFICANT CHANGE UP (ref 3.5–5.3)
PROT SERPL-MCNC: 6.2 G/DL — SIGNIFICANT CHANGE UP (ref 6–8.3)
PROTHROM AB SERPL-ACNC: 12.8 SEC — SIGNIFICANT CHANGE UP (ref 10.6–13.6)
RBC # BLD: 3.65 M/UL — LOW (ref 4.2–5.8)
RBC # FLD: 13.6 % — SIGNIFICANT CHANGE UP (ref 10.3–14.5)
SODIUM SERPL-SCNC: 138 MMOL/L — SIGNIFICANT CHANGE UP (ref 135–145)
TROPONIN I, HIGH SENSITIVITY RESULT: 5.4 NG/L — SIGNIFICANT CHANGE UP
TROPONIN I, HIGH SENSITIVITY RESULT: 7.6 NG/L — SIGNIFICANT CHANGE UP
WBC # BLD: 5.73 K/UL — SIGNIFICANT CHANGE UP (ref 3.8–10.5)
WBC # FLD AUTO: 5.73 K/UL — SIGNIFICANT CHANGE UP (ref 3.8–10.5)

## 2021-12-10 PROCEDURE — 99284 EMERGENCY DEPT VISIT MOD MDM: CPT | Mod: 25

## 2021-12-10 PROCEDURE — 93005 ELECTROCARDIOGRAM TRACING: CPT

## 2021-12-10 PROCEDURE — 84484 ASSAY OF TROPONIN QUANT: CPT

## 2021-12-10 PROCEDURE — 85730 THROMBOPLASTIN TIME PARTIAL: CPT

## 2021-12-10 PROCEDURE — 83735 ASSAY OF MAGNESIUM: CPT

## 2021-12-10 PROCEDURE — 84100 ASSAY OF PHOSPHORUS: CPT

## 2021-12-10 PROCEDURE — 36415 COLL VENOUS BLD VENIPUNCTURE: CPT

## 2021-12-10 PROCEDURE — 93010 ELECTROCARDIOGRAM REPORT: CPT

## 2021-12-10 PROCEDURE — 71275 CT ANGIOGRAPHY CHEST: CPT | Mod: 26,MA

## 2021-12-10 PROCEDURE — 85379 FIBRIN DEGRADATION QUANT: CPT

## 2021-12-10 PROCEDURE — 85610 PROTHROMBIN TIME: CPT

## 2021-12-10 PROCEDURE — 83880 ASSAY OF NATRIURETIC PEPTIDE: CPT

## 2021-12-10 PROCEDURE — 71275 CT ANGIOGRAPHY CHEST: CPT | Mod: MA

## 2021-12-10 PROCEDURE — 85025 COMPLETE CBC W/AUTO DIFF WBC: CPT

## 2021-12-10 PROCEDURE — 71045 X-RAY EXAM CHEST 1 VIEW: CPT

## 2021-12-10 PROCEDURE — 99285 EMERGENCY DEPT VISIT HI MDM: CPT

## 2021-12-10 PROCEDURE — 71045 X-RAY EXAM CHEST 1 VIEW: CPT | Mod: 26

## 2021-12-10 PROCEDURE — 83690 ASSAY OF LIPASE: CPT

## 2021-12-10 PROCEDURE — 80053 COMPREHEN METABOLIC PANEL: CPT

## 2021-12-10 NOTE — ED ADULT NURSE NOTE - OBJECTIVE STATEMENT
Pt BIBA in from the [facility] living for chest pain. Pt reported that he started having chest pain around 4 am this morning. Pt denies any diaphoresis , nausea or SOB. Chest pain resolved upon arrival at the ED after he was given aspirin and nitro sl by the EMS crew.

## 2021-12-10 NOTE — CONSULT NOTE ADULT - ASSESSMENT
The patient is a 81 year old male with a history of HL, CAD, Parkinson's disease who presents with chest pain.    Plan:  - ECG with no evidence of ischemia or infarction  - First troponin negative. Rule out an acute MI with two sets of cardiac enzymes.  - D-dimer elevated  - Check CTA chest  - Currently chest pain free  - If above negative, patient can be discharged from a cardiac standpoint

## 2021-12-10 NOTE — ED PROVIDER NOTE - OBJECTIVE STATEMENT
Pt is a 80 yo male who presents to the ED with a cc of chest pain. PMHx of CAD, constipation, HLD, inguinal hernia, Parkinson disease, h/o renal stone. Pt reports that he awoke with chest pain around 3 am. He reports that it was difficult for him to fall back asleep. Reports that the pain was across the lower part of his chest. Reports difficult for him to describe. Pt reports that the pain was constant until the ambulance arrived and he received ASA and SL Nitro. At this time pt denies chest pain. Denies fever, chills, N/V, SOB, abd pain. Pt reports prior MI but reports that he was unaware of pain at the time. Pt received 2 Phizers vaccines and yesterday he receieved the Vargas and Vargas vaccine     PMD:   Cardio: Antonia LYONS vaccinated Pt is a 82 yo male who presents to the ED with a cc of chest pain. PMHx of CAD, constipation, HLD, inguinal hernia, Parkinson disease, h/o renal stone. Pt reports that he awoke with chest pain around 3 am. He reports that it was difficult for him to fall back asleep. Reports that the pain was across the lower part of his chest. Reports difficult for him to describe. Pt reports that the pain was constant until the ambulance arrived and he received ASA and SL Nitro. At this time pt denies chest pain. Denies fever, chills, N/V, SOB, abd pain. Pt reports prior MI but reports that he was unaware of pain at the time. Pt received 2 Pfizer's vaccines and yesterday he received the Vargas and Munetrix vaccine     PMD:   Cardio: Antonia LYONS vaccinated

## 2021-12-10 NOTE — ED PROVIDER NOTE - CLINICAL SUMMARY MEDICAL DECISION MAKING FREE TEXT BOX
Pt is a 82 yo male who presents to the ED with a cc of chest pain. PMHx of CAD, constipation, HLD, inguinal hernia, Parkinson disease, h/o renal stone. Pt reports that he awoke with chest pain around 3 am. He reports that it was difficult for him to fall back asleep. Reports that the pain was across the lower part of his chest. Reports difficult for him to describe. Pt reports that the pain was constant until the ambulance arrived and he received ASA and SL Nitro. At this time pt denies chest pain. Denies fever, chills, N/V, SOB, abd pain. Pt reports prior MI but reports that he was unaware of pain at the time. Pt received 2 Pfizer's vaccines and yesterday he received the Vargas and Vargas vaccine. Pt presenting with CP not present at this time. Will obtain screening labs, trop, EKG, and consult with cardiology. Will monitor

## 2021-12-10 NOTE — ED PROVIDER NOTE - NSICDXPASTMEDICALHX_GEN_ALL_CORE_FT
PAST MEDICAL HISTORY:  CAD (Coronary Artery Disease) 2001, s/p cardiac cath with stent 2001    CAD (coronary artery disease)     Constipation     Hyperlipidemia 2006    Inguinal hernia right    Parkinson Disease 2009    Renal Calculi 1984

## 2021-12-10 NOTE — ED PROVIDER NOTE - PATIENT PORTAL LINK FT
You can access the FollowMyHealth Patient Portal offered by [facility] by registering at the following website: [email or web address]. By joining [employer]’s FollowMyHealth portal, you will also be able to view your health information using other applications (apps) compatible with our system.

## 2021-12-10 NOTE — CONSULT NOTE ADULT - SUBJECTIVE AND OBJECTIVE BOX
History of Present Illness: The patient is a 81 year old male with a history of HL, CAD, Parkinson's disease who presents with chest pain. He received a COVID-19 vaccine yesterday with [employer]. Overnight he developed intermittent substernal chest pressure. It was non-radiating, non-pleuritic, non-positional. No shortness of breath, dizziness, palpitations. He received nitroglycerin by EMS this morning with improvement in symptoms.    Past Medical/Surgical History:  HL, CAD, Parkinson's disease    Medications:  Home Medications:  Acidophilus oral tablet: 2 tab(s) orally once a day (:)  Aspir 81 oral delayed release tablet: 1 tab(s) orally once a day (:)  CARB/LEVO    TAB 25-100M tab(s) orally 3 times a day (:)  Centrum Silver oral tablet: 1 tab(s) orally once a day (:)  Citracal: 1 tab(s) orally once a day (:)  Co Q-10 100 mg oral capsule: 2 cap(s) orally 2 times a day (:23)  docusate sodium 100 mg oral capsule: 1 cap(s) orally 2 times a day (:23)  Plavix 75 mg oral tablet: 1 tab(s) orally once a day (:23)  trihexyphenidyl: 2 milligram(s) orally 3 times a day (:)  venlafaxine 37.5 mg oral tablet: 1 tab(s) orally 2 times a day (:23)  Vitamin B12 1000 mcg oral tablet: 1 tab(s) orally once a day (:23)  Vitamin D3 1000 intl units oral capsule: 1 cap(s) orally once a day (:23)  Zocor 20 mg oral tablet: 1 tab(s) orally once a day (at bedtime) (:23)      Family History: Non-contributory family history of premature cardiovascular atherosclerotic disease    Social History: No tobacco, alcohol or drug use    Review of Systems:  General: No fevers, chills, weight gain  Skin: No rashes, color changes  Cardiovascular: No chest pain, orthopnea  Respiratory: No shortness of breath, cough  Gastrointestinal: No nausea, abdominal pain  Genitourinary: No incontinence, pain with urination  Musculoskeletal: No pain, swelling, decreased range of motion  Neurological: No headache, weakness  Psychiatric: No depression, anxiety  Endocrine: No weight gain, increased thirst  All other systems are comprehensively negative.    Physical Exam:  Vitals:        Vital Signs Last 24 Hrs  T(C): 36.7 (10 Dec 2021 08:21), Max: 36.7 (10 Dec 2021 08:21)  T(F): 98.1 (10 Dec 2021 08:21), Max: 98.1 (10 Dec 2021 08:21)  HR: 92 (10 Dec 2021 08:) (92 - 92)  BP: 102/60 (10 Dec 2021 08:) (102/60 - 102/60)  BP(mean): --  RR: 18 (10 Dec 2021 08:) (18 - 18)  SpO2: 95% (10 Dec 2021 08:) (95% - 95%)  General: NAD  HEENT: MMM  Neck: No JVD, no carotid bruit  Lungs: CTAB  CV: RRR, nl S1/S2, no M/R/G  Abdomen: S/NT/ND, +BS  Extremities: No LE edema, no cyanosis  Neuro: AAOx3, non-focal  Skin: No rash    Labs:                        11.2   5.73  )-----------( 219      ( 10 Dec 2021 08:54 )             34.7     12-10    138  |  105  |  24<H>  ----------------------------<  118<H>  4.3   |  29  |  1.01    Ca    8.6      10 Dec 2021 08:54  Phos  3.2     12-10  Mg     1.7     12-10    TPro  6.2  /  Alb  3.0<L>  /  TBili  0.3  /  DBili  x   /  AST  13  /  ALT  17  /  AlkPhos  60  12-10        PT/INR - ( 10 Dec 2021 08:54 )   PT: 12.8 sec;   INR: 1.06 ratio         PTT - ( 10 Dec 2021 08:54 )  PTT:29.9 sec    ECG: NSR, normal axis, no ST abnormality

## 2021-12-10 NOTE — ED PROVIDER NOTE - NSICDXPASTSURGICALHX_GEN_ALL_CORE_FT
PAST SURGICAL HISTORY:  S/P Cardiac Cath 2001 with stent, 2002    S/P Cystoscopy X3, ESWL 1984    S/P Lumbar Discectomy 1995

## 2021-12-10 NOTE — ED PROVIDER NOTE - NSFOLLOWUPINSTRUCTIONS_ED_ALL_ED_FT
Return to the ED for any new or worsening symptoms  Take your medication as prescribed  Follow up with your PMD in 2-3 days for a recheck   Advance activity as tolerated    Nonspecific Chest Pain    Chest pain can be caused by many different conditions. Some causes of chest pain can be life-threatening. These will require treatment right away. Serious causes of chest pain include:  •Heart attack.      •A tear in the body's main blood vessel.      •Redness and swelling (inflammation) around your heart.      •Blood clot in your lungs.    Other causes of chest pain may not be so serious. These include:  •Heartburn.      •Anxiety or stress.      •Damage to bones or muscles in your chest.      •Lung infections.    Chest pain can feel like:  •Pain or discomfort in your chest.      •Crushing, pressure, aching, or squeezing pain.       •Burning or tingling.       •Dull or sharp pain that is worse when you move, cough, or take a deep breath.       •Pain or discomfort that is also felt in your back, neck, jaw, shoulder, or arm, or pain that spreads to any of these areas.      It is hard to know whether your pain is caused by something that is serious or something that is not so serious. So it is important to see your doctor right away if you have chest pain.      Follow these instructions at home:    Medicines     •Take over-the-counter and prescription medicines only as told by your doctor.      •If you were prescribed an antibiotic medicine, take it as told by your doctor. Do not stop taking the antibiotic even if you start to feel better.        Lifestyle      •Rest as told by your doctor.      • Do not use any products that contain nicotine or tobacco, such as cigarettes, e-cigarettes, and chewing tobacco. If you need help quitting, ask your doctor.      • Do not drink alcohol.    •Make lifestyle changes as told by your doctor. These may include:  •Getting regular exercise. Ask your doctor what activities are safe for you.      •Eating a heart-healthy diet. A diet and nutrition specialist (dietitian) can help you to learn healthy eating options.      •Staying at a healthy weight.      •Treating diabetes or high blood pressure, if needed.      •Lowering your stress. Activities such as yoga and relaxation techniques can help.        General instructions     •Pay attention to any changes in your symptoms. Tell your doctor about them or any new symptoms.      •Avoid any activities that cause chest pain.      •Keep all follow-up visits as told by your doctor. This is important. You may need more testing if your chest pain does not go away.        Contact a doctor if:    •Your chest pain does not go away.      •You feel depressed.      •You have a fever.        Get help right away if:    •Your chest pain is worse.      •You have a cough that gets worse, or you cough up blood.      •You have very bad (severe) pain in your belly (abdomen).      •You pass out (faint).    •You have either of these for no clear reason:  •Sudden chest discomfort.      •Sudden discomfort in your arms, back, neck, or jaw.        •You have shortness of breath at any time.      •You suddenly start to sweat, or your skin gets clammy.      •You feel sick to your stomach (nauseous).      •You throw up (vomit).      •You suddenly feel lightheaded or dizzy.      •You feel very weak or tired.      •Your heart starts to beat fast, or it feels like it is skipping beats.      These symptoms may be an emergency. Do not wait to see if the symptoms will go away. Get medical help right away. Call your local emergency services (911 in the [place] Do not drive yourself to the hospital.       Summary    •Chest pain can be caused by many different conditions. The cause may be serious and need treatment right away. If you have chest pain, see your doctor right away.      •Follow your doctor's instructions for taking medicines and making lifestyle changes.       •Keep all follow-up visits as told by your doctor. This includes visits for any further testing if your chest pain does not go away.      •Be sure to know the signs that show that your condition has become worse. Get help right away if you have these symptoms.      This information is not intended to replace advice given to you by your health care provider. Make sure you discuss any questions you have with your health care provider.

## 2021-12-13 ENCOUNTER — EMERGENCY (EMERGENCY)
Facility: HOSPITAL | Age: 81
LOS: 1 days | Discharge: HOME | End: 2021-12-13
Attending: EMERGENCY MEDICINE | Admitting: EMERGENCY MEDICINE
Payer: MEDICARE

## 2021-12-13 VITALS
HEART RATE: 77 BPM | DIASTOLIC BLOOD PRESSURE: 81 MMHG | TEMPERATURE: 99 F | SYSTOLIC BLOOD PRESSURE: 137 MMHG | RESPIRATION RATE: 16 BRPM | OXYGEN SATURATION: 98 %

## 2021-12-13 VITALS
DIASTOLIC BLOOD PRESSURE: 85 MMHG | RESPIRATION RATE: 18 BRPM | OXYGEN SATURATION: 96 % | SYSTOLIC BLOOD PRESSURE: 128 MMHG | HEART RATE: 84 BPM | HEIGHT: 70 IN | WEIGHT: 145.06 LBS | TEMPERATURE: 98 F

## 2021-12-13 LAB
ALBUMIN SERPL ELPH-MCNC: 3.2 G/DL — LOW (ref 3.3–5)
ALP SERPL-CCNC: 65 U/L — SIGNIFICANT CHANGE UP (ref 30–120)
ALT FLD-CCNC: <10 U/L DA — LOW (ref 10–60)
ANION GAP SERPL CALC-SCNC: 5 MMOL/L — SIGNIFICANT CHANGE UP (ref 5–17)
APPEARANCE UR: ABNORMAL
AST SERPL-CCNC: 16 U/L — SIGNIFICANT CHANGE UP (ref 10–40)
BACTERIA # UR AUTO: ABNORMAL
BASOPHILS # BLD AUTO: 0.04 K/UL — SIGNIFICANT CHANGE UP (ref 0–0.2)
BASOPHILS NFR BLD AUTO: 0.6 % — SIGNIFICANT CHANGE UP (ref 0–2)
BILIRUB SERPL-MCNC: 0.4 MG/DL — SIGNIFICANT CHANGE UP (ref 0.2–1.2)
BILIRUB UR-MCNC: NEGATIVE — SIGNIFICANT CHANGE UP
BUN SERPL-MCNC: 25 MG/DL — HIGH (ref 7–23)
CALCIUM SERPL-MCNC: 8.7 MG/DL — SIGNIFICANT CHANGE UP (ref 8.4–10.5)
CHLORIDE SERPL-SCNC: 106 MMOL/L — SIGNIFICANT CHANGE UP (ref 96–108)
CO2 SERPL-SCNC: 31 MMOL/L — SIGNIFICANT CHANGE UP (ref 22–31)
COLOR SPEC: YELLOW — SIGNIFICANT CHANGE UP
CREAT SERPL-MCNC: 1.1 MG/DL — SIGNIFICANT CHANGE UP (ref 0.5–1.3)
DIFF PNL FLD: ABNORMAL
EOSINOPHIL # BLD AUTO: 0.04 K/UL — SIGNIFICANT CHANGE UP (ref 0–0.5)
EOSINOPHIL NFR BLD AUTO: 0.6 % — SIGNIFICANT CHANGE UP (ref 0–6)
EPI CELLS # UR: SIGNIFICANT CHANGE UP
GLUCOSE BLDC GLUCOMTR-MCNC: 122 MG/DL — HIGH (ref 70–99)
GLUCOSE SERPL-MCNC: 118 MG/DL — HIGH (ref 70–99)
GLUCOSE UR QL: NEGATIVE MG/DL — SIGNIFICANT CHANGE UP
HCT VFR BLD CALC: 37 % — LOW (ref 39–50)
HGB BLD-MCNC: 11.8 G/DL — LOW (ref 13–17)
IMM GRANULOCYTES NFR BLD AUTO: 0.2 % — SIGNIFICANT CHANGE UP (ref 0–1.5)
KETONES UR-MCNC: NEGATIVE — SIGNIFICANT CHANGE UP
LEUKOCYTE ESTERASE UR-ACNC: ABNORMAL
LIDOCAIN IGE QN: 58 U/L — LOW (ref 73–393)
LYMPHOCYTES # BLD AUTO: 1.24 K/UL — SIGNIFICANT CHANGE UP (ref 1–3.3)
LYMPHOCYTES # BLD AUTO: 18.8 % — SIGNIFICANT CHANGE UP (ref 13–44)
MCHC RBC-ENTMCNC: 30.6 PG — SIGNIFICANT CHANGE UP (ref 27–34)
MCHC RBC-ENTMCNC: 31.9 GM/DL — LOW (ref 32–36)
MCV RBC AUTO: 95.9 FL — SIGNIFICANT CHANGE UP (ref 80–100)
MONOCYTES # BLD AUTO: 0.6 K/UL — SIGNIFICANT CHANGE UP (ref 0–0.9)
MONOCYTES NFR BLD AUTO: 9.1 % — SIGNIFICANT CHANGE UP (ref 2–14)
NEUTROPHILS # BLD AUTO: 4.66 K/UL — SIGNIFICANT CHANGE UP (ref 1.8–7.4)
NEUTROPHILS NFR BLD AUTO: 70.7 % — SIGNIFICANT CHANGE UP (ref 43–77)
NITRITE UR-MCNC: NEGATIVE — SIGNIFICANT CHANGE UP
NRBC # BLD: 0 /100 WBCS — SIGNIFICANT CHANGE UP (ref 0–0)
PH UR: 6.5 — SIGNIFICANT CHANGE UP (ref 5–8)
PLATELET # BLD AUTO: 239 K/UL — SIGNIFICANT CHANGE UP (ref 150–400)
POTASSIUM SERPL-MCNC: 4.1 MMOL/L — SIGNIFICANT CHANGE UP (ref 3.5–5.3)
POTASSIUM SERPL-SCNC: 4.1 MMOL/L — SIGNIFICANT CHANGE UP (ref 3.5–5.3)
PROT SERPL-MCNC: 6.5 G/DL — SIGNIFICANT CHANGE UP (ref 6–8.3)
PROT UR-MCNC: 100 MG/DL
RBC # BLD: 3.86 M/UL — LOW (ref 4.2–5.8)
RBC # FLD: 13.7 % — SIGNIFICANT CHANGE UP (ref 10.3–14.5)
RBC CASTS # UR COMP ASSIST: SIGNIFICANT CHANGE UP /HPF (ref 0–4)
SARS-COV-2 RNA SPEC QL NAA+PROBE: SIGNIFICANT CHANGE UP
SODIUM SERPL-SCNC: 142 MMOL/L — SIGNIFICANT CHANGE UP (ref 135–145)
SP GR SPEC: 1.01 — SIGNIFICANT CHANGE UP (ref 1.01–1.02)
TROPONIN I, HIGH SENSITIVITY RESULT: 5.2 NG/L — SIGNIFICANT CHANGE UP
UROBILINOGEN FLD QL: NEGATIVE MG/DL — SIGNIFICANT CHANGE UP
WBC # BLD: 6.59 K/UL — SIGNIFICANT CHANGE UP (ref 3.8–10.5)
WBC # FLD AUTO: 6.59 K/UL — SIGNIFICANT CHANGE UP (ref 3.8–10.5)
WBC UR QL: >50

## 2021-12-13 PROCEDURE — 82962 GLUCOSE BLOOD TEST: CPT

## 2021-12-13 PROCEDURE — 96365 THER/PROPH/DIAG IV INF INIT: CPT

## 2021-12-13 PROCEDURE — 70450 CT HEAD/BRAIN W/O DYE: CPT | Mod: 26,MA

## 2021-12-13 PROCEDURE — 73030 X-RAY EXAM OF SHOULDER: CPT

## 2021-12-13 PROCEDURE — 93010 ELECTROCARDIOGRAM REPORT: CPT

## 2021-12-13 PROCEDURE — 73030 X-RAY EXAM OF SHOULDER: CPT | Mod: 26,LT

## 2021-12-13 PROCEDURE — 70450 CT HEAD/BRAIN W/O DYE: CPT | Mod: MA

## 2021-12-13 PROCEDURE — 99285 EMERGENCY DEPT VISIT HI MDM: CPT | Mod: 25

## 2021-12-13 PROCEDURE — 71045 X-RAY EXAM CHEST 1 VIEW: CPT

## 2021-12-13 PROCEDURE — 36415 COLL VENOUS BLD VENIPUNCTURE: CPT

## 2021-12-13 PROCEDURE — 96361 HYDRATE IV INFUSION ADD-ON: CPT

## 2021-12-13 PROCEDURE — 84484 ASSAY OF TROPONIN QUANT: CPT

## 2021-12-13 PROCEDURE — 83690 ASSAY OF LIPASE: CPT

## 2021-12-13 PROCEDURE — 87086 URINE CULTURE/COLONY COUNT: CPT

## 2021-12-13 PROCEDURE — 71045 X-RAY EXAM CHEST 1 VIEW: CPT | Mod: 26

## 2021-12-13 PROCEDURE — 93005 ELECTROCARDIOGRAM TRACING: CPT

## 2021-12-13 PROCEDURE — 85025 COMPLETE CBC W/AUTO DIFF WBC: CPT

## 2021-12-13 PROCEDURE — 99285 EMERGENCY DEPT VISIT HI MDM: CPT

## 2021-12-13 PROCEDURE — 81001 URINALYSIS AUTO W/SCOPE: CPT

## 2021-12-13 PROCEDURE — 87635 SARS-COV-2 COVID-19 AMP PRB: CPT

## 2021-12-13 PROCEDURE — 80053 COMPREHEN METABOLIC PANEL: CPT

## 2021-12-13 RX ORDER — CEFTRIAXONE 500 MG/1
1000 INJECTION, POWDER, FOR SOLUTION INTRAMUSCULAR; INTRAVENOUS ONCE
Refills: 0 | Status: COMPLETED | OUTPATIENT
Start: 2021-12-13 | End: 2021-12-13

## 2021-12-13 RX ORDER — VENLAFAXINE HCL 75 MG
1 CAPSULE, EXT RELEASE 24 HR ORAL
Qty: 0 | Refills: 0 | DISCHARGE

## 2021-12-13 RX ORDER — CHOLECALCIFEROL (VITAMIN D3) 125 MCG
1 CAPSULE ORAL
Qty: 0 | Refills: 0 | DISCHARGE

## 2021-12-13 RX ORDER — SODIUM CHLORIDE 9 MG/ML
1000 INJECTION INTRAMUSCULAR; INTRAVENOUS; SUBCUTANEOUS ONCE
Refills: 0 | Status: COMPLETED | OUTPATIENT
Start: 2021-12-13 | End: 2021-12-13

## 2021-12-13 RX ORDER — CEFUROXIME AXETIL 250 MG
1 TABLET ORAL
Qty: 14 | Refills: 0
Start: 2021-12-13 | End: 2021-12-19

## 2021-12-13 RX ORDER — UBIDECARENONE 100 MG
2 CAPSULE ORAL
Qty: 0 | Refills: 0 | DISCHARGE

## 2021-12-13 RX ORDER — LACTOBACILLUS ACIDOPHILUS 100MM CELL
2 CAPSULE ORAL
Qty: 0 | Refills: 0 | DISCHARGE

## 2021-12-13 RX ORDER — SIMVASTATIN 20 MG/1
1 TABLET, FILM COATED ORAL
Qty: 0 | Refills: 0 | DISCHARGE

## 2021-12-13 RX ORDER — ASPIRIN/CALCIUM CARB/MAGNESIUM 324 MG
1 TABLET ORAL
Qty: 0 | Refills: 0 | DISCHARGE

## 2021-12-13 RX ORDER — TRIHEXYPHENIDYL HCL 2 MG
2 TABLET ORAL
Qty: 0 | Refills: 0 | DISCHARGE

## 2021-12-13 RX ORDER — MULTIVIT-MIN/FERROUS GLUCONATE 9 MG/15 ML
1 LIQUID (ML) ORAL
Qty: 0 | Refills: 0 | DISCHARGE

## 2021-12-13 RX ORDER — PREGABALIN 225 MG/1
1 CAPSULE ORAL
Qty: 0 | Refills: 0 | DISCHARGE

## 2021-12-13 RX ORDER — DOCUSATE SODIUM 100 MG
1 CAPSULE ORAL
Qty: 0 | Refills: 0 | DISCHARGE

## 2021-12-13 RX ORDER — MIRTAZAPINE 45 MG/1
25 TABLET, ORALLY DISINTEGRATING ORAL
Qty: 0 | Refills: 0 | DISCHARGE

## 2021-12-13 RX ORDER — CLOPIDOGREL BISULFATE 75 MG/1
1 TABLET, FILM COATED ORAL
Qty: 0 | Refills: 0 | DISCHARGE

## 2021-12-13 RX ORDER — NITROFURANTOIN MACROCRYSTAL 50 MG
1 CAPSULE ORAL
Qty: 20 | Refills: 0
Start: 2021-12-13 | End: 2021-12-22

## 2021-12-13 RX ADMIN — SODIUM CHLORIDE 1000 MILLILITER(S): 9 INJECTION INTRAMUSCULAR; INTRAVENOUS; SUBCUTANEOUS at 11:16

## 2021-12-13 RX ADMIN — CEFTRIAXONE 1000 MILLIGRAM(S): 500 INJECTION, POWDER, FOR SOLUTION INTRAMUSCULAR; INTRAVENOUS at 14:12

## 2021-12-13 RX ADMIN — SODIUM CHLORIDE 1000 MILLILITER(S): 9 INJECTION INTRAMUSCULAR; INTRAVENOUS; SUBCUTANEOUS at 12:52

## 2021-12-13 RX ADMIN — CEFTRIAXONE 100 MILLIGRAM(S): 500 INJECTION, POWDER, FOR SOLUTION INTRAMUSCULAR; INTRAVENOUS at 13:42

## 2021-12-13 NOTE — ED ADULT NURSE REASSESSMENT NOTE - NS ED NURSE REASSESS COMMENT FT1
reached out to [facility] states transfer will be about another 45minutes, pt made aware. No acute changes, safety maintained

## 2021-12-13 NOTE — ED ADULT NURSE NOTE - OBJECTIVE STATEMENT
Sent from [facility]isted Living with increasing confusion, Hx alzheimer's, pt awake/ alert on arrival, confused as to time and situation.

## 2021-12-13 NOTE — CONSULT NOTE ADULT - SUBJECTIVE AND OBJECTIVE BOX
Patient is a 81y old  Male who presents with a chief complaint of Confusion    HPI:  81y old  Male with h/o Parkinson disease, depression came to ED last night, sent from assisted living for confusion. pt relates he is here due to left shoulder pain that began a little after midnight. pt denies fever, ha, cp, sob, cough, abd pain, d/n/v, weakness,  numbness, edema or calf pain, or travel, or any other complaints. pt denies hx of pe or dvt.  PMD - Dr. Llamas  No report of facial asymmetry  No lateralized weakness.  Pt c/o Pain at left hip area.  No c/o blurry or double vision.  No c/o difficulty speaking or swallowing.   Pt is found to be volume depleted and also has signs of UTI.    PAST MEDICAL & SURGICAL HISTORY:    CAD (Coronary Artery Disease)  , s/p cardiac cath with stent     Parkinson Disease      Hyperlipidemia      Renal Calculi      Inguinal hernia  right    Constipation    CAD (coronary artery disease)    S/P Cardiac Cath   with stent,     S/P Lumbar Discectomy      S/P Cystoscopy  X3, ESWL 1984    Home Medications:    Acidophilus oral tablet: 2 tab(s) orally once a day (13 Dec 2021 11:30)  Aspirin 81 oral delayed release tablet: 1 tab(s) orally once a day (13 Dec 2021 11:30)  CARB/LEVO    TAB 25-100M tab(s) orally 3 times a day (13 Dec 2021 11:30)  Centrum Silver oral tablet: 1 tab(s) orally once a day (13 Dec 2021 11:30)  Citracal: 1 tab(s) orally once a day (13 Dec 2021 11:30)  Co Q-10 100 mg oral capsule: 2 cap(s) orally 2 times a day (13 Dec 2021 11:30)  docusate sodium 100 mg oral capsule: 1 cap(s) orally 2 times a day (13 Dec 2021 11:30)  mirtazapine: 25 milligram(s) orally once a day (at bedtime) (13 Dec 2021 11:30)  Plavix 75 mg oral tablet: 1 tab(s) orally once a day (13 Dec 2021 11:30)  trihexyphenidyl: 2 milligram(s) orally 3 times a day (13 Dec 2021 11:30)  venlafaxine 37.5 mg oral tablet: 1 tab(s) orally 2 times a day (13 Dec 2021 11:30)  Vitamin D3 1000 intl units oral capsule: 1 cap(s) orally once a day (13 Dec 2021 11:30)  Zocor 20 mg oral tablet: 1 tab(s) orally once a day (at bedtime) (13 Dec 2021 11:30)    Allergies    lactose (Diarrhea)  No Known Drug Allergies    SOCIAL HISTORY:    No h/o Smoking.   No h/o alcohol use.    FAMILY HISTORY: Asked the pt.     REVIEW OF SYSTEMS:    CONSTITUTIONAL: No fever  EYES: No eye pain,   ENMT:  No sinus or throat pain  NECK: No pain or stiffness  RESPIRATORY: No cough, No hemoptysis; No shortness of breath  CARDIOVASCULAR: No acute chest pain, palpitations,  or leg swelling  GASTROINTESTINAL: No abdominal pain. No nausea, vomiting, or hematemesis;  No melena or hematochezia.  GENITOURINARY: No  hematuria, or incontinence  MUSCULOSKELETAL: No joint swelling; No extremity pain  SKIN: No itching, rashes, or lesions   LYMPH NODES: No enlarged glands  NEUROLOGICAL: No headaches, memory loss, H/o PD.  PSYCHIATRIC: No depression, anxiety, mood swings, or difficulty sleeping  ENDOCRINE: No heat or cold intolerance;   HEME/LYMPH: No easy bruising, or bleeding gums  Allergy/Immunology. No medication allergy. No seasonal allergies.    PHYSICAL EXAM:  Vital Signs Last 24 Hrs  T(F): 98.4 (21 @ 09:25)  HR: 70 (21 @ 11:40)  BP: 150/84 (21 @ 11:40)  RR: 17 (21 @ 11:40)    GENERAL: NAD, well-groomed, well-developed  HEAD:  Atraumatic, Normocephalic  EYES: EOMI, PERRLA, conjunctiva and sclera clear  NECK: Supple, No JVD, thyroid non-palpable    On Neurological Examination:    Mental Status - Pt is alert, awake, oriented X3.  Poor recall.     Speech -  Normal. Pt has no aphasia.    Cranial Nerves - Pupils 3 mm equal and reactive to light, extraocular eye movements intact. Pt has no visual field deficit. No facial asymmetry. Tongue - is in midline.    Motor Exam - 4 plus/5 all over, No drift. No shaking. No tremors. Positive cogwheeling and clasp knife rigidity.    Sensory Exam - Pt withdraws all extremities equally on stimulation.     Gait - Not tested as he c/o left hip    Deep tendon Reflexes - 2 plus all over.    Coordination - Fine finger movements and Finger to nose are limited but are normal on both sides.       Neck Supple -  Yes.    LABS:                        11.8   6.59  )-----------( 239      ( 13 Dec 2021 11:29 )             37.0         142  |  106  |  25<H>  ----------------------------<  118<H>  4.1   |  31  |  1.10    Ca    8.7      13 Dec 2021 11:29    TPro  6.5  /  Alb  3.2<L>  /  TBili  0.4  /  DBili  x   /  AST  16  /  ALT  <10<L>  /  AlkPhos  65  12    Urinalysis Basic - ( 13 Dec 2021 11:29 )    Color: Yellow / Appearance: Turbid / S.010 /  Gluc: x / Ketone: Negative  / Bili: Negative / Urobili: Negative mg/dL   Blood: x / Protein: 100 mg/dL / Nitrite: Negative   Leuk Esterase: Moderate / RBC: 0-2 /HPF / WBC >50   Sq Epi: x / Non Sq Epi: Few / Bacteria: Moderate    RADIOLOGY & ADDITIONAL STUDIES:    < from: CT Head No Cont (21 @ 11:28) >    IMPRESSION:    NO EVIDENCE OF ACUTE INTRACRANIAL HEMORRHAGE OR HYDROCEPHALUS.  ATROPHY  WITH WHITE MATTER ISCHEMIC CHANGES.    < end of copied text >    
History of Present Illness: The patient is a 81 year old male with a history of HL, CAD, Parkinson's disease who presents with confusion. The patient is a poor historian. He was seen by me 3 days ago in the ED for chest pain - work-up was negative. He denies chest pain and shortness of breath to me today. He had noted left shoulder pain to ED attending.    Past Medical/Surgical History:  HL, CAD, Parkinson's disease    Medications:  Home Medications:  Acidophilus oral tablet: 2 tab(s) orally once a day (:23)  Aspir 81 oral delayed release tablet: 1 tab(s) orally once a day (:23)  CARB/LEVO    TAB 25-100M tab(s) orally 3 times a day ()  Centrum Silver oral tablet: 1 tab(s) orally once a day ()  Citracal: 1 tab(s) orally once a day (:)  Co Q-10 100 mg oral capsule: 2 cap(s) orally 2 times a day (:23)  docusate sodium 100 mg oral capsule: 1 cap(s) orally 2 times a day (:23)  Plavix 75 mg oral tablet: 1 tab(s) orally once a day (:23)  trihexyphenidyl: 2 milligram(s) orally 3 times a day (:23)  venlafaxine 37.5 mg oral tablet: 1 tab(s) orally 2 times a day (:23)  Vitamin B12 1000 mcg oral tablet: 1 tab(s) orally once a day (:23)  Vitamin D3 1000 intl units oral capsule: 1 cap(s) orally once a day (:23)  Zocor 20 mg oral tablet: 1 tab(s) orally once a day (at bedtime) (:23)      Family History: Non-contributory family history of premature cardiovascular atherosclerotic disease    Social History: No tobacco, alcohol or drug use    Review of Systems:  General: No fevers, chills, weight gain  Skin: No rashes, color changes  Cardiovascular: No chest pain, orthopnea  Respiratory: No shortness of breath, cough  Gastrointestinal: No nausea, abdominal pain  Genitourinary: No incontinence, pain with urination  Musculoskeletal: No pain, swelling, decreased range of motion  Neurological: No headache, weakness  Psychiatric: No depression, anxiety  Endocrine: No weight gain, increased thirst  All other systems are comprehensively negative.    Physical Exam:  Vitals:        Vital Signs Last 24 Hrs  T(C): 36.9 (13 Dec 2021 09:25), Max: 36.9 (13 Dec 2021 09:25)  T(F): 98.4 (13 Dec 2021 09:25), Max: 98.4 (13 Dec 2021 09:)  HR: 84 (13 Dec 2021 09:) (84 - 84)  BP: 128/85 (13 Dec 2021 09:) (128/85 - 128/85)  BP(mean): --  RR: 18 (13 Dec 2021 09:) (18 - 18)  SpO2: 96% (13 Dec 2021 09:) (96% - 96%)  General: NAD  HEENT: MMM  Neck: No JVD, no carotid bruit  Lungs: CTAB  CV: RRR, nl S1/S2, no M/R/G  Abdomen: S/NT/ND, +BS  Extremities: No LE edema, no cyanosis  Neuro: AAOx3, non-focal  Skin: No rash    Labs:                  ECG: NSR, normal axis, no ST abnormality

## 2021-12-13 NOTE — ED PROVIDER NOTE - CLINICAL SUMMARY MEDICAL DECISION MAKING FREE TEXT BOX
pt bib ems from assisted living for confusion. pt only c/o left should discomfort - ekg/xr/ct/labs/ivf

## 2021-12-13 NOTE — ED CLERICAL - CLERICAL COMMENTS
called [facility] ems for transport back to the [facility].  anirudh will  patient between 1730 and 1800.

## 2021-12-13 NOTE — CONSULT NOTE ADULT - ASSESSMENT
81y old  Male with h/o Parkinson disease, depression came to ED last night, sent from assisted living for confusion.  H/o PD on Sinemet.  No Fall or LOC.  CT Head - No acute pathology.  No signs of CVA.  No seizure reported.  Poor recall and spellings  - denotes likely underlying poor cognition.  C/o left shoulder and left hip area pain.  Pt is found to be volume depleted and also has signs of UTI. Which likely caused encephalopathy  Rec Xray Left shoulder and Pelvis/Left Hip  IV Fluids.  Urine cultures.  Antibiotics?  D/w ED physician, Dr. Obregon.  Continue Sinemet.  Continue ASA 81/Plavix/Zocor  PT if X rays are neg.  Would continue to follow if admitted.    
The patient is a 81 year old male with a history of HL, CAD, Parkinson's disease who presents with confusion.     Plan:  - Patient denies chest or shoulder pain to be but is in ED for confusion  - ECG with no evidence of ischemia or infarction  - Check CXR  - Check one set of cardiac enzymes  - No further cardiac testing if above is negative

## 2021-12-13 NOTE — ED PROVIDER NOTE - PATIENT PORTAL LINK FT
You can access the FollowMyHealth Patient Portal offered by [facility] by registering at the following website: [email or web address]. By joining [employer]’s FollowMyHealth portal, you will also be able to view your health information using other applications (apps) compatible with our system.

## 2021-12-13 NOTE — ED PROVIDER NOTE - CARE PROVIDER_API CALL
VARUN NAVARRO  Cardiology  [address]  [place] [address]  Phone: [phone]  Fax: [phone]  Follow Up Time: 1-3 Days

## 2021-12-13 NOTE — ED PROVIDER NOTE - OBJECTIVE STATEMENT
pt bib ems from assisted living for confusion. pt relates he is here due to left shoulder pain that began a little after midnight. pt denies fever, ha, cp, sob, cough, abd pain, d/n/v, weakness, numbness, edema or calf pain, or travel, or any other complaints. pt denies hx of pe or dvt.  pmd - marisabelr

## 2021-12-13 NOTE — ED ADULT NURSE REASSESSMENT NOTE - NS ED NURSE REASSESS COMMENT FT1
assumed patient care @ 11:05AM, Iv access obtained, pt AOx4, VSS, Pt denies complaints, states he got into argument with staff at facility where he lives, thinks he may be dehydrated. resp even ulabored, MAEx4, neuro intact, will ctm

## 2021-12-13 NOTE — ED PROVIDER NOTE - PROGRESS NOTE DETAILS
kell (cards) and anne marie (neuro) seen eval pt, cleared for d/c and outpt f/u. Reevaluated patient at bedside.  Patient feeling well, denies pain, ambulated in er with walker.  Discussed the results of all diagnostic testing in ED and copies of all reports given.   An opportunity to ask questions was given.  Discussed the importance of prompt, close medical follow-up.  Patient will return with any changes, concerns or persistent / worsening symptoms.  Understanding of all instructions verbalized. outpt pharmacy called said their records reflect allergy to ceftin, will switch rx to macrobid

## 2021-12-13 NOTE — ED PROVIDER NOTE - CHPI ED SYMPTOMS NEG
no back pain/no dizziness/no fever/no headache/no loss of consciousness/no nausea/no pain/no vomiting/no chills

## 2021-12-13 NOTE — ED ADULT NURSE REASSESSMENT NOTE - NS ED NURSE REASSESS COMMENT FT1
pt ambulated in ED w/ walker, Parkinson gait as per spouse which is his normal baseline. Dinner tray ordered for patient. pt resting comfortable awaiting ambulance

## 2021-12-14 LAB
CULTURE RESULTS: SIGNIFICANT CHANGE UP
SPECIMEN SOURCE: SIGNIFICANT CHANGE UP

## 2022-09-19 NOTE — ASU PATIENT PROFILE, ADULT - NS SC CAGE ALCOHOL GUILTY ABOUT
Impression: Puckering of macula, left eye: H35.372. Plan: No treatment is required or recommended at this time. Will continue to observe condition and or symptoms. Reassured patient of current condition and treatment. no

## 2022-10-13 NOTE — ED ADULT NURSE NOTE - OBJECTIVE STATEMENT
pt to er states he felt weak today at home upon arrival is alert oriented speech clear resp even unlabored skin intact warm and dry iv started left ac pta family at bedside with pt
Patient presenting today with a chief complaint of back pain after bending down to lift something no red flags at this time neurovascularly intact high suspicion for MSK injury will obtain lumbar x-ray medicate for symptoms and monitor

## 2023-04-24 NOTE — ED ADULT NURSE NOTE - CAS TRG GENERAL AIRWAY, MLM
Patent Gabapentin Pregnancy And Lactation Text: This medication is Pregnancy Category C and isn't considered safe during pregnancy. It is excreted in breast milk.

## 2023-09-12 NOTE — ED ADULT NURSE NOTE - CHIEF COMPLAINT QUOTE
Pt states" dizziness, nausea" Dutasteride Pregnancy And Lactation Text: This medication is absolutely contraindicated in women, especially during pregnancy and breast feeding. Feminization of male fetuses is possible if taking while pregnant.

## 2024-04-24 NOTE — ED ADULT NURSE NOTE - CAS DISCH ACCOMP BY
Spouse Quality 110: Preventive Care And Screening: Influenza Immunization: Influenza Immunization Administered during Influenza season Quality 226: Preventive Care And Screening: Tobacco Use: Screening And Cessation Intervention: Patient screened for tobacco use and is an ex/non-smoker Detail Level: Detailed Quality 402: Tobacco Use And Help With Quitting Among Adolescents: Patient screened for tobacco and never smoked Quality 431: Preventive Care And Screening: Unhealthy Alcohol Use - Screening: Patient identified as an unhealthy alcohol user when screened for unhealthy alcohol use using a systematic screening method and received brief counseling

## 2024-10-10 NOTE — ED PROVIDER NOTE - CARDIAC, MLM
1. Continue Bariatric Clear Liquids diet   2. Recommend advance to Phase 1 Bariatric Full Liquid diet when medically feasible   3. Encourage adequate hydration with goal of 4oz/hr and/or 64 oz/day   4. Monitor BMP, BG, POCT, lytes, replete prn  Normal rate, regular rhythm.  Heart sounds S1, S2.  No murmurs, rubs or gallops.

## 2025-06-15 NOTE — ED ADULT NURSE NOTE - CCCP TRG CHIEF CMPLNT
I have reviewed discharge instructions with the patient and daughter, Genoveva Mccullough.  The patient and daughter verbalized understanding.  Discharge medications reviewed with patient and daughter and appropriate educational materials and side effects teaching were provided.  Follow-up appointments reviewed with patient.  Opportunity for questions or concerns given.  Port de-accessed without difficulty, pt tolerated well.  Patients belongings gathered and sent with patient.  Patient is ready for discharge.    1330: Patient discharged via [facility].       dizziness